# Patient Record
Sex: FEMALE | Race: ASIAN | NOT HISPANIC OR LATINO | URBAN - METROPOLITAN AREA
[De-identification: names, ages, dates, MRNs, and addresses within clinical notes are randomized per-mention and may not be internally consistent; named-entity substitution may affect disease eponyms.]

---

## 2019-07-21 ENCOUNTER — INPATIENT (INPATIENT)
Facility: HOSPITAL | Age: 34
LOS: 2 days | Discharge: HOME | End: 2019-07-24
Attending: OBSTETRICS & GYNECOLOGY | Admitting: OBSTETRICS & GYNECOLOGY
Payer: MEDICAID

## 2019-07-21 VITALS
TEMPERATURE: 98 F | OXYGEN SATURATION: 100 % | RESPIRATION RATE: 16 BRPM | HEART RATE: 103 BPM | DIASTOLIC BLOOD PRESSURE: 93 MMHG | SYSTOLIC BLOOD PRESSURE: 180 MMHG

## 2019-07-21 LAB
ALBUMIN SERPL ELPH-MCNC: 4.4 G/DL — SIGNIFICANT CHANGE UP (ref 3.3–5)
ALP SERPL-CCNC: 66 U/L — SIGNIFICANT CHANGE UP (ref 40–120)
ALT FLD-CCNC: 38 U/L — HIGH (ref 4–33)
ANION GAP SERPL CALC-SCNC: 12 MMO/L — SIGNIFICANT CHANGE UP (ref 7–14)
ANISOCYTOSIS BLD QL: SIGNIFICANT CHANGE UP
APPEARANCE UR: SIGNIFICANT CHANGE UP
AST SERPL-CCNC: 20 U/L — SIGNIFICANT CHANGE UP (ref 4–32)
BASOPHILS # BLD AUTO: 0.05 K/UL — SIGNIFICANT CHANGE UP (ref 0–0.2)
BASOPHILS NFR BLD AUTO: 0.5 % — SIGNIFICANT CHANGE UP (ref 0–2)
BILIRUB SERPL-MCNC: 0.3 MG/DL — SIGNIFICANT CHANGE UP (ref 0.2–1.2)
BILIRUB UR-MCNC: NEGATIVE — SIGNIFICANT CHANGE UP
BLOOD UR QL VISUAL: SIGNIFICANT CHANGE UP
BUN SERPL-MCNC: 12 MG/DL — SIGNIFICANT CHANGE UP (ref 7–23)
CALCIUM SERPL-MCNC: 9.4 MG/DL — SIGNIFICANT CHANGE UP (ref 8.4–10.5)
CHLORIDE SERPL-SCNC: 105 MMOL/L — SIGNIFICANT CHANGE UP (ref 98–107)
CO2 SERPL-SCNC: 21 MMOL/L — LOW (ref 22–31)
COLOR SPEC: SIGNIFICANT CHANGE UP
CREAT SERPL-MCNC: 0.74 MG/DL — SIGNIFICANT CHANGE UP (ref 0.5–1.3)
EOSINOPHIL # BLD AUTO: 0.32 K/UL — SIGNIFICANT CHANGE UP (ref 0–0.5)
EOSINOPHIL NFR BLD AUTO: 3.1 % — SIGNIFICANT CHANGE UP (ref 0–6)
GLUCOSE SERPL-MCNC: 101 MG/DL — HIGH (ref 70–99)
GLUCOSE UR-MCNC: NEGATIVE — SIGNIFICANT CHANGE UP
HCT VFR BLD CALC: 32.6 % — LOW (ref 34.5–45)
HCT VFR BLD CALC: 34.2 % — LOW (ref 34.5–45)
HGB BLD-MCNC: 8.4 G/DL — LOW (ref 11.5–15.5)
HGB BLD-MCNC: 9 G/DL — LOW (ref 11.5–15.5)
HYPOCHROMIA BLD QL: SLIGHT — SIGNIFICANT CHANGE UP
IMM GRANULOCYTES NFR BLD AUTO: 0.3 % — SIGNIFICANT CHANGE UP (ref 0–1.5)
KETONES UR-MCNC: NEGATIVE — SIGNIFICANT CHANGE UP
LEUKOCYTE ESTERASE UR-ACNC: NEGATIVE — SIGNIFICANT CHANGE UP
LIDOCAIN IGE QN: 35.4 U/L — SIGNIFICANT CHANGE UP (ref 7–60)
LYMPHOCYTES # BLD AUTO: 28.8 % — SIGNIFICANT CHANGE UP (ref 13–44)
LYMPHOCYTES # BLD AUTO: 3.02 K/UL — SIGNIFICANT CHANGE UP (ref 1–3.3)
MANUAL SMEAR VERIFICATION: SIGNIFICANT CHANGE UP
MCHC RBC-ENTMCNC: 18.2 PG — LOW (ref 27–34)
MCHC RBC-ENTMCNC: 18.8 PG — LOW (ref 27–34)
MCHC RBC-ENTMCNC: 25.8 % — LOW (ref 32–36)
MCHC RBC-ENTMCNC: 26.3 % — LOW (ref 32–36)
MCV RBC AUTO: 70.7 FL — LOW (ref 80–100)
MCV RBC AUTO: 71.4 FL — LOW (ref 80–100)
MICROCYTES BLD QL: SIGNIFICANT CHANGE UP
MONOCYTES # BLD AUTO: 0.4 K/UL — SIGNIFICANT CHANGE UP (ref 0–0.9)
MONOCYTES NFR BLD AUTO: 3.8 % — SIGNIFICANT CHANGE UP (ref 2–14)
NEUTROPHILS # BLD AUTO: 6.67 K/UL — SIGNIFICANT CHANGE UP (ref 1.8–7.4)
NEUTROPHILS NFR BLD AUTO: 63.5 % — SIGNIFICANT CHANGE UP (ref 43–77)
NITRITE UR-MCNC: NEGATIVE — SIGNIFICANT CHANGE UP
NRBC # FLD: 0 K/UL — SIGNIFICANT CHANGE UP (ref 0–0)
NRBC # FLD: 0 K/UL — SIGNIFICANT CHANGE UP (ref 0–0)
PH UR: 5.5 — SIGNIFICANT CHANGE UP (ref 5–8)
PLATELET # BLD AUTO: 459 K/UL — HIGH (ref 150–400)
PLATELET # BLD AUTO: 465 K/UL — HIGH (ref 150–400)
PLATELET COUNT - ESTIMATE: NORMAL — SIGNIFICANT CHANGE UP
PMV BLD: 11.1 FL — SIGNIFICANT CHANGE UP (ref 7–13)
PMV BLD: 11.3 FL — SIGNIFICANT CHANGE UP (ref 7–13)
POIKILOCYTOSIS BLD QL AUTO: SLIGHT — SIGNIFICANT CHANGE UP
POLYCHROMASIA BLD QL SMEAR: SLIGHT — SIGNIFICANT CHANGE UP
POTASSIUM SERPL-MCNC: 3.8 MMOL/L — SIGNIFICANT CHANGE UP (ref 3.5–5.3)
POTASSIUM SERPL-SCNC: 3.8 MMOL/L — SIGNIFICANT CHANGE UP (ref 3.5–5.3)
PROT SERPL-MCNC: 7.9 G/DL — SIGNIFICANT CHANGE UP (ref 6–8.3)
PROT UR-MCNC: 100 — HIGH
RBC # BLD: 4.61 M/UL — SIGNIFICANT CHANGE UP (ref 3.8–5.2)
RBC # BLD: 4.79 M/UL — SIGNIFICANT CHANGE UP (ref 3.8–5.2)
RBC # FLD: 19.9 % — HIGH (ref 10.3–14.5)
RBC # FLD: 19.9 % — HIGH (ref 10.3–14.5)
RBC CASTS # UR COMP ASSIST: SIGNIFICANT CHANGE UP (ref 0–?)
SODIUM SERPL-SCNC: 138 MMOL/L — SIGNIFICANT CHANGE UP (ref 135–145)
SP GR SPEC: 1.02 — SIGNIFICANT CHANGE UP (ref 1–1.04)
UROBILINOGEN FLD QL: 2 — SIGNIFICANT CHANGE UP
WBC # BLD: 10.49 K/UL — SIGNIFICANT CHANGE UP (ref 3.8–10.5)
WBC # BLD: 11.98 K/UL — HIGH (ref 3.8–10.5)
WBC # FLD AUTO: 10.49 K/UL — SIGNIFICANT CHANGE UP (ref 3.8–10.5)
WBC # FLD AUTO: 11.98 K/UL — HIGH (ref 3.8–10.5)
WBC UR QL: SIGNIFICANT CHANGE UP (ref 0–?)

## 2019-07-21 PROCEDURE — 76830 TRANSVAGINAL US NON-OB: CPT | Mod: 26

## 2019-07-21 PROCEDURE — 74182 MRI ABDOMEN W/CONTRAST: CPT | Mod: 26

## 2019-07-21 PROCEDURE — 72196 MRI PELVIS W/DYE: CPT | Mod: 26

## 2019-07-21 RX ORDER — KETOROLAC TROMETHAMINE 30 MG/ML
30 SYRINGE (ML) INJECTION ONCE
Refills: 0 | Status: DISCONTINUED | OUTPATIENT
Start: 2019-07-21 | End: 2019-07-21

## 2019-07-21 RX ADMIN — Medication 30 MILLIGRAM(S): at 15:21

## 2019-07-21 NOTE — ED ADULT NURSE NOTE - OBJECTIVE STATEMENT
34 year old [place] for [place]. Denies PMH but states she does not see doctors in [facility]. pt states that her period is due on 7/23 however began to experience pelvic pain last night and heavy vaginal bleeding with clot passage this morning. denies dizzines denies LOC denies SOB or light headed. abd soft non tender, denies NVD. IV 22 g RAC labs sent TQ removed. Hgb noted to be 8.5 but pt hemodynamically table and not symptomatic. will CTM.

## 2019-07-21 NOTE — ED CDU PROVIDER INITIAL DAY NOTE - PMH
Diabetes mellitus    HTN (hypertension) <<----- Click to add NO pertinent Past Medical History No significant past medical history

## 2019-07-21 NOTE — ED PROVIDER NOTE - OBJECTIVE STATEMENT
35 yo female PMHx of HTN and DM presents to the ED c/o constant, sharp, 8/10, lower left abdominal pain that radiates to her back that woke her from sleep this morning. She has not taken any pain medication and admits that moving makes the pain worse and laying still makes the pain better. She also endorses that she is having heavy vaginal bleeding with clots that is more intense than her usual menstrual bleeding. Her LMP was 6/23/19. She last had sexual intercourse 3 weeks ago without protection, she is not using any contraception. She does not have a PCP or OB/GYN, and has not seen a provider since she was pregnant with her child 18 years ago. She denies fever, chills, SOB, CP, n/v/d, vaginal discharge, dysuria, prior episodes, or any other complaints. 35 yo F pmh HTN and DM pw c/o constant, sharp, 8/10, lower left abdominal pain that radiates to her back that woke her from sleep this morning. She has not taken any pain medication and admits that moving makes the pain worse and laying still makes the pain better. She also endorses that she is having heavy vaginal bleeding with clots that is more intense than her usual menstrual bleeding. Her LMP was 6/23/19. She last had sexual intercourse 3 weeks ago without protection, she is not using any contraception. She does not have a PCP or OB/GYN, and has not seen a provider since she was pregnant with her child 18 years ago. She denies fever, chills, SOB, CP, n/v/d, vaginal discharge, dysuria, prior episodes, or any other complaints. 35 yo F pmh HTN and DM pw c/o constant, nicole, 8/10, lower left abdominal pain radiating to her back accompanied with vaginal bleeding upon awakening this am. LMP 6/23- normal menstrual period at that time.  States today's bleeding much heavier and passing clots having gone through 15 pads within 6 hours. Patient is sexually active- last 3 weeks ago without protection. Has not seen a doctor nor had BW done in over 10 years. Has only one child- 18 years in age- normal pregnancy. Denies fever, chills, SOB, CP, n/v/d, vaginal discharge, dysuria, prior episodes, or any other complaints. Appears well. no distress.

## 2019-07-21 NOTE — ED CDU PROVIDER INITIAL DAY NOTE - OBJECTIVE STATEMENT
33 yo female  with no significant medical history c/o acute onset diffuse lower abdominal cramping and heavy vaginal bleeding that began this morning. Pt due for her period tomorrow but today began bleeding and required her to change her pad 15 times prior to arrival. NO associated dizziness, chest pain or SOB. Pt found to be anemic in the ED but unclear if prior h/o anemia sicne pt has seen a doctor in 18 years. Pt found to have ovarian mass on TVUS and requires MRI for further assessment.

## 2019-07-21 NOTE — ED PROVIDER NOTE - CLINICAL SUMMARY MEDICAL DECISION MAKING FREE TEXT BOX
A: 33 yo female PMHx of HTN and DM presents to the ED c/o constant, sharp, 8/10, lower left abdominal pain that radiates to her back that woke her from sleep this morning. Also complains of heavy vaginal bleeding with clots. POC bHCG negative. Consider ovarian torsion vs ovarian cyst. Less likely diverticulitis vs pancreatitis.  P: labs, pain control, TVUS to rule out ovarian torsion vs ovarian cyst. 33 yo F pmh HTN, DM pw c/o constant, sharp, 8/10, lower left abdominal pain radiating to her back accompanied with heavy vaginal bleeding upon awakening this am out of the norm from usual menstrual periods, POC UCG negative. Consider GYNGU pathology. In the absence of n/v/d, f/c less likely abdominal pathology- will obtain basic labs, UA, UCX and TVUS, administer fluids and pain control, re-evaluate

## 2019-07-21 NOTE — ED CDU PROVIDER INITIAL DAY NOTE - ATTENDING CONTRIBUTION TO CARE
Mason: 35 yo female  with no significant medical problems c/o acute onset diffuse lower abdominal cramping and heavy vaginal bleeding that began this morning. Pt due for her period tomorrow but today began bleeding and required her to change her pad 15 times prior to arrival. NO associated dizziness, chest pain or SOB. Pt found to be anemic in the ED but unclear if prior h/o anemia sicne pt has seen a doctor in 18 years. Pt found to have ovarian mass on TVUS and requires MRI for further assessment. Exam: GENERAL: well appearing, NAD, HEENT: MMM, PERRLA, CARDIO: +S1/S2, no murmurs, rubs or gallops, LUNGS: CTA B/L, no wheezing, rales or rhonchi, GYN: exam done by CHELLY Santa and chaperoned by me, no adnexal TTP or CMT. small amount og blood in vaginal vault but no active bleeding from cervix , ABD: soft, mild diffuse lower abdominal TTP worst in LLQ, BSx4 quadrants, no guarding or rigidity. EXT: No LE edema NEURO: AxOx3, SKIN: no rashes or lesions, well perfused A/P- 33yo female with vaginal bleeding and lower abdominal pain. PT anemic on labs but baseline unclear. CDU plan for repeat CBC, GYn evaluation and MRI to assess new ovarian mass.

## 2019-07-21 NOTE — ED CDU PROVIDER INITIAL DAY NOTE - MEDICAL DECISION MAKING DETAILS
A/P- 35yo female with vaginal bleeding and lower abdominal pain. PT anemic on labs but baseline unclear. CDU plan for repeat CBC, GYn evaluation and MRI to assess new ovarian mass.

## 2019-07-21 NOTE — ED CDU PROVIDER INITIAL DAY NOTE - PHYSICAL EXAMINATION
Exam: GENERAL: well appearing, NAD, HEENT: MMM, PERRLA, CARDIO: +S1/S2, no murmurs, rubs or gallops, LUNGS: CTA B/L, no wheezing, rales or rhonchi, GYN: exam done by CHELLY Santa and chaperoned by me, no adnexal TTP or CMT. small amount of blood in vaginal vault but no active bleeding from cervix , ABD: soft, mild diffuse lower abdominal TTP worst in LLQ, BSx4 quadrants, no guarding or rigidity. MSK: No CVA TTP EXT: No LE edema NEURO: AxOx3, SKIN: no rashes or lesions, well perfused

## 2019-07-21 NOTE — ED PROVIDER NOTE - PROGRESS NOTE DETAILS
CHELLY Santa- Hg 8.4. no prior Hg's. Awaiting trasnvaginal results. Bleeding lessoning. Likely send to CDU for serial crts. PA Tiberio- Transvaginal US revealing left adnexal mass suspicious for neoplasm. Will send to CDU for MRI AB/Pelvis, serial crts. Discussed with Dr. Cuevas and CDU CHELLY Priest

## 2019-07-21 NOTE — ED ADULT NURSE NOTE - CHIEF COMPLAINT QUOTE
pt comes to ED for abdominal pain and vaginal bleeding since this morning. pt LMP 6/23 pmhx of HTN pt took meds today. pt BP is high otherwise VSS pt denies HA or blurry vision.  NAD

## 2019-07-22 ENCOUNTER — TRANSCRIPTION ENCOUNTER (OUTPATIENT)
Age: 34
End: 2019-07-22

## 2019-07-22 DIAGNOSIS — N83.9 NONINFLAMMATORY DISORDER OF OVARY, FALLOPIAN TUBE AND BROAD LIGAMENT, UNSPECIFIED: ICD-10-CM

## 2019-07-22 DIAGNOSIS — Z98.890 OTHER SPECIFIED POSTPROCEDURAL STATES: Chronic | ICD-10-CM

## 2019-07-22 LAB
APTT BLD: 28.5 SEC — SIGNIFICANT CHANGE UP (ref 27.5–36.3)
BLD GP AB SCN SERPL QL: NEGATIVE — SIGNIFICANT CHANGE UP
CANCER AG125 SERPL-ACNC: 312 U/ML — HIGH
CANCER AG19-9 SERPL-ACNC: 17 U/ML — SIGNIFICANT CHANGE UP
CEA SERPL-MCNC: 1 NG/ML — SIGNIFICANT CHANGE UP (ref 1–3.8)
GLUCOSE BLDC GLUCOMTR-MCNC: 120 MG/DL — HIGH (ref 70–99)
GLUCOSE BLDC GLUCOMTR-MCNC: 186 MG/DL — HIGH (ref 70–99)
HBA1C BLD-MCNC: 5.1 % — SIGNIFICANT CHANGE UP (ref 4–5.6)
HCG SERPL-ACNC: < 5 MIU/ML — SIGNIFICANT CHANGE UP
HCT VFR BLD CALC: 27.3 % — LOW (ref 34.5–45)
HCT VFR BLD CALC: 28.2 % — LOW (ref 34.5–45)
HGB BLD-MCNC: 7.2 G/DL — LOW (ref 11.5–15.5)
HGB BLD-MCNC: 7.4 G/DL — LOW (ref 11.5–15.5)
INR BLD: 1.11 — SIGNIFICANT CHANGE UP (ref 0.88–1.17)
MCHC RBC-ENTMCNC: 18.4 PG — LOW (ref 27–34)
MCHC RBC-ENTMCNC: 18.7 PG — LOW (ref 27–34)
MCHC RBC-ENTMCNC: 26.2 % — LOW (ref 32–36)
MCHC RBC-ENTMCNC: 26.4 % — LOW (ref 32–36)
MCV RBC AUTO: 70.1 FL — LOW (ref 80–100)
MCV RBC AUTO: 70.7 FL — LOW (ref 80–100)
NRBC # FLD: 0 K/UL — SIGNIFICANT CHANGE UP (ref 0–0)
NRBC # FLD: 0 K/UL — SIGNIFICANT CHANGE UP (ref 0–0)
PLATELET # BLD AUTO: 365 K/UL — SIGNIFICANT CHANGE UP (ref 150–400)
PLATELET # BLD AUTO: 377 K/UL — SIGNIFICANT CHANGE UP (ref 150–400)
PMV BLD: 10.5 FL — SIGNIFICANT CHANGE UP (ref 7–13)
PMV BLD: 11.8 FL — SIGNIFICANT CHANGE UP (ref 7–13)
PROTHROM AB SERPL-ACNC: 12.7 SEC — SIGNIFICANT CHANGE UP (ref 9.8–13.1)
RBC # BLD: 3.86 M/UL — SIGNIFICANT CHANGE UP (ref 3.8–5.2)
RBC # BLD: 4.02 M/UL — SIGNIFICANT CHANGE UP (ref 3.8–5.2)
RBC # FLD: 19.3 % — HIGH (ref 10.3–14.5)
RBC # FLD: 19.3 % — HIGH (ref 10.3–14.5)
RH IG SCN BLD-IMP: POSITIVE — SIGNIFICANT CHANGE UP
WBC # BLD: 10.14 K/UL — SIGNIFICANT CHANGE UP (ref 3.8–10.5)
WBC # BLD: 8.62 K/UL — SIGNIFICANT CHANGE UP (ref 3.8–10.5)
WBC # FLD AUTO: 10.14 K/UL — SIGNIFICANT CHANGE UP (ref 3.8–10.5)
WBC # FLD AUTO: 8.62 K/UL — SIGNIFICANT CHANGE UP (ref 3.8–10.5)

## 2019-07-22 PROCEDURE — 93010 ELECTROCARDIOGRAM REPORT: CPT

## 2019-07-22 PROCEDURE — 99223 1ST HOSP IP/OBS HIGH 75: CPT | Mod: AI

## 2019-07-22 RX ORDER — ACETAMINOPHEN 500 MG
975 TABLET ORAL EVERY 6 HOURS
Refills: 0 | Status: DISCONTINUED | OUTPATIENT
Start: 2019-07-22 | End: 2019-07-24

## 2019-07-22 RX ORDER — DEXTROSE 50 % IN WATER 50 %
25 SYRINGE (ML) INTRAVENOUS ONCE
Refills: 0 | Status: DISCONTINUED | OUTPATIENT
Start: 2019-07-22 | End: 2019-07-24

## 2019-07-22 RX ORDER — SODIUM CHLORIDE 9 MG/ML
1000 INJECTION, SOLUTION INTRAVENOUS
Refills: 0 | Status: DISCONTINUED | OUTPATIENT
Start: 2019-07-22 | End: 2019-07-24

## 2019-07-22 RX ORDER — INSULIN LISPRO 100/ML
VIAL (ML) SUBCUTANEOUS
Refills: 0 | Status: DISCONTINUED | OUTPATIENT
Start: 2019-07-22 | End: 2019-07-24

## 2019-07-22 RX ORDER — DEXTROSE 50 % IN WATER 50 %
15 SYRINGE (ML) INTRAVENOUS ONCE
Refills: 0 | Status: DISCONTINUED | OUTPATIENT
Start: 2019-07-22 | End: 2019-07-24

## 2019-07-22 RX ORDER — INSULIN LISPRO 100/ML
VIAL (ML) SUBCUTANEOUS AT BEDTIME
Refills: 0 | Status: DISCONTINUED | OUTPATIENT
Start: 2019-07-22 | End: 2019-07-23

## 2019-07-22 RX ORDER — KETOROLAC TROMETHAMINE 30 MG/ML
30 SYRINGE (ML) INJECTION ONCE
Refills: 0 | Status: DISCONTINUED | OUTPATIENT
Start: 2019-07-22 | End: 2019-07-22

## 2019-07-22 RX ORDER — GLUCAGON INJECTION, SOLUTION 0.5 MG/.1ML
1 INJECTION, SOLUTION SUBCUTANEOUS ONCE
Refills: 0 | Status: DISCONTINUED | OUTPATIENT
Start: 2019-07-22 | End: 2019-07-24

## 2019-07-22 RX ORDER — DEXTROSE 50 % IN WATER 50 %
12.5 SYRINGE (ML) INTRAVENOUS ONCE
Refills: 0 | Status: DISCONTINUED | OUTPATIENT
Start: 2019-07-22 | End: 2019-07-24

## 2019-07-22 RX ORDER — IBUPROFEN 200 MG
600 TABLET ORAL EVERY 6 HOURS
Refills: 0 | Status: DISCONTINUED | OUTPATIENT
Start: 2019-07-22 | End: 2019-07-24

## 2019-07-22 RX ADMIN — Medication 600 MILLIGRAM(S): at 16:43

## 2019-07-22 RX ADMIN — Medication 975 MILLIGRAM(S): at 20:40

## 2019-07-22 RX ADMIN — Medication 600 MILLIGRAM(S): at 16:13

## 2019-07-22 RX ADMIN — SODIUM CHLORIDE 125 MILLILITER(S): 9 INJECTION, SOLUTION INTRAVENOUS at 09:55

## 2019-07-22 RX ADMIN — Medication 30 MILLIGRAM(S): at 05:43

## 2019-07-22 RX ADMIN — Medication 975 MILLIGRAM(S): at 19:52

## 2019-07-22 NOTE — H&P ADULT - NSHPLABSRESULTS_GEN_ALL_CORE
< end of copied text >    < from: MR Pelvis w/ IV Cont (07.21.19 @ 23:10) >    EXAM:  MR PELVIS IC      EXAM:  MR ABDOMEN IC        PROCEDURE DATE:  Jul 21 2019         INTERPRETATION:  [facility] RADIOLOGIST PRELIMINARY REPORT    EXAM:    MR Abdomen Without and With Contrast     EXAM DATE/TIME:    7/21/2019 10:10 PM     CLINICAL HISTORY:    34 years old, female; heavy vaginal bleeding and left ovarian mass    TECHNIQUE:    Imaging protocol: MR of the abdomen without and with intravenous   contrast.     COMPARISON:   Ultrasound 07/21/2019    FINDINGS:    Liver: No mass.    Gallbladder and bile ducts: Unremarkable. No stones. No ductal dilation.    Pancreas: Unremarkable. No ductal dilation.    Spleen: Unremarkable. No splenomegaly.    Adrenals: Unremarkable. No mass.    Kidneys and ureters: Small bilateral renal cysts   Stomach and bowel: Unremarkable.    Intraperitoneal space: No fluid collection.    Arteries: No abdominal aortic aneurysm.    Lymph nodes: Few 1 cm right lower quadrant mesenteric lymph nodes.    Bones/joints: Unremarkable.     Soft tissues: Unremarkable.     IMPRESSION:   Bilateral renal cysts as described above.       =========================  EXAM:    MR Pelvis Without and With Contrast     EXAM DATE/TIME:    7/21/2019 10:10 PM     CLINICAL HISTORY:    34 years old, female; left adnexal mass and heavy vaginal bleeding    TECHNIQUE:    Imaging protocol: Magnetic resonance images of the pelvis without and   with   intravenous contrast.     COMPARISON:    No relevant prior studies available.     FINDINGS:    Limitations: Motion artifact limits this study.    Intraperitoneal space: No free fluid.    Reproductive: Uterus enlarged 6 x 6 x 11 cm; endometrium approximately   5-6   mm. Multicystic changes of the uterine walls with irregular thickening   particularly along the anterior wall. with very poorly defined junction   zone consistent with adenomyosis  .    12 x 9 x 9.3 cm cystic mass, likely involving left ovary within the   pelvic culdesac; faint enhancement of the septa. Hemorrhagic component   raising the possibility of endometriosis. No definite nodule   4.5 x 5 cm cyst in right ovary, likely physiologic   Bones/joints: Unremarkable. No fracture.    Soft tissues: Unremarkable.     IMPRESSION:   Adenomyosis of the uterus.  Multiseptated left adnexal and cul-de-sac mass with hemorrhagic component   most suggestive of ovarian neoplasm, likely benign, with differential   diagnosis of endometriosis.    < end of copied text >

## 2019-07-22 NOTE — H&P ADULT - ATTENDING COMMENTS
saw patient with resident. Large complex adnexal mass seen on sonogram in a setting of pelvic pain, recommendation is to remove ovarian mass. patient's pain was tolerable after receiving toradol. Er consulted gyn at 9:00 AM this morning and patient had just finished eating a sandwich. Decision was to put patient on tomorrow's schedule since pain is presently tolerable and cannot do it now since patient just ate.

## 2019-07-22 NOTE — ED CDU PROVIDER SUBSEQUENT DAY NOTE - HISTORY
33 y/o female no pmh c/o lower abd pain and heavy vaginal bleeding x today. Pt admit sto changing her pad about 15 times. denies chest pain, sob, n/v/d, dizziness, syncope, fever or chills. Pt found to have abnormal TVUS w/ ovarian mass vs hydrosalpinx and sent to CDU for MRI.

## 2019-07-22 NOTE — CONSULT NOTE ADULT - ASSESSMENT
35 yo female with T2DM on oral meds, HTN present with acute onset abdominal pain and vaginal bleeding, found to have left adnexal multiseptated complex cyst concern for malignancy vs endometriosis. Medicine was consulted for pre-operative clearance.     #Pre-operative clearance  -Patient with no known pulmonary or cardiac history.   -RCRi score of 1 corresponding to 6% 30 days risk of death, MI or cardiac arrest.   -Patient is a low risk patient undergoing a high risk procedure. Patient is currently medically optimized to proceed for surgery tomorrow. Pending EKG    #Acute blood loss anemia with microcytosis  -In the setting of vaginal bleeding from hemorrhaging ovarian cyst and adenomyosis  -Recommending checking CBC q12h for now, and transfuse to keep hgb > 7. Active T&S.  -Start ferrous sulfate daily.   -Check iron study with TIBC, ferritin.     Rolf Cano M.D. - PGY3  Internal Medicine Resident  MAR - [phone] 35 yo female with T2DM on oral meds, HTN present with acute onset abdominal pain and vaginal bleeding, found to have left adnexal multiseptated complex cyst concern for malignancy vs endometriosis. Medicine was consulted for pre-operative clearance.     #Pre-operative clearance  -Patient with no known pulmonary or cardiac history. METS>4.   -RCRi score of 1 corresponding to 6% 30 days risk of death, MI or cardiac arrest.   -EKG with nonspecific TWI in lead V6, no prior EKG to compare. No acute ST/T wave abnormalities to suggest acute ischemia  -Patient is a low risk patient undergoing a high risk procedure. Patient is currently medically optimized to proceed for surgery tomorrow. No further testing/imaging needed at this time.     #Acute blood loss anemia with microcytosis  -In the setting of vaginal bleeding from hemorrhaging ovarian cyst and adenomyosis  -Recommending checking CBC q12h for now, and transfuse to keep hgb > 7. Active T&S.  -Start ferrous sulfate daily.   -Check iron study with TIBC, ferritin.     Rolf Cano M.D. - PGY3  Internal Medicine Resident  [facility] - [phone]

## 2019-07-22 NOTE — ED CDU PROVIDER DISPOSITION NOTE - CLINICAL COURSE
33 y/o female presented to ED c/o sharp lower abdominal pain and vaginal bleeding. In ED had US showing ? cystic mass to left ovary. Sent to CDU for trending h/h, serial abd exams and MRI for further evaluation of lesion. MRI revealing 12cm ovarian mass - seen by SHAHLA who is recommending admission for OR tomorrow. Pt. stable at this time. WIll be kept NPO after midnight.

## 2019-07-22 NOTE — H&P ADULT - PROBLEM SELECTOR PLAN 1
- patient to be admitted to inpatient gyn service  - patient to be booked for OR for laparoscopic left salpingo-oophorectomy, possible ex-lap, possible staging, Pap smear  - consents to be signed  - OR booking form to be submitted  - regular diet with NPO at midnight prior to surgery  - medicine consult called for medical clearance prior to OR  - tylenol and motrin for pain  - continue to monitor VS and sxs    Patient seen w/ attending Dr. Janis Hdz MD PGY3

## 2019-07-22 NOTE — H&P ADULT - HISTORY OF PRESENT ILLNESS
34yoF  h/o cHTN, DM LMP 19 presents with newly found left adnexal mass on imaging yesterday. Pt presented to the ED yesterday after she awoke with lower abdominal pain and heavy vaginal bleeding. Pt states that she never had this sort of pain with previous periods and her periods are regular and occur every month. Pt reports that this period is much heavier than her normal period. Due to these concerns she presented to the ED where a transvaginal ultrasound was performed and a left adnexal mass was seen (as below in radiology report). Radiology recommended MRI to f/u transvaginal ultrasound and patient was sent to CDU for H&H trending and MRI overnight. MRI was performed and results were pending until this AM. Following prelim MRI read, gyn was consulted for evaluation. Upon evaluation with attending, pt reports continued pain despite toradol and states she just ate breakfast. Pt reports that she was last seen by an ob/gyn 18 years ago at the birth of her daughter. She has not had a pap smear in that time. Pt also reports that she does not follow up for her HTN and DM and is on no medications at present. Pt denies n/v, f/c, CP, SOB, or any other concerns.      OB/GYN HISTORY:   NSVDx1  TOPx4    Last Menstrual Period: 19    Name of GYN Physician: none  Date of Last Pap:   History of Abnormal Pap: denies

## 2019-07-22 NOTE — H&P ADULT - NSHPPHYSICALEXAM_GEN_ALL_CORE
Vital Signs Last 24 Hrs  T(C): 37 (22 Jul 2019 10:26), Max: 37.2 (21 Jul 2019 19:00)  T(F): 98.6 (22 Jul 2019 10:26), Max: 99 (21 Jul 2019 21:28)  HR: 99 (22 Jul 2019 10:26) (88 - 103)  BP: 149/96 (22 Jul 2019 10:26) (149/96 - 180/93)  BP(mean): --  RR: 16 (22 Jul 2019 10:26) (16 - 18)  SpO2: 100% (22 Jul 2019 10:26) (100% - 100%)    PHYSICAL EXAM:  Gen: NAD, alert and oriented x 3  Cardiovascular: regular   Respiratory: breathing comfortably on RA  Abd: soft, diffusely tender across lowe abdomen, non-distended; no rebound, no guarding  Pelvic: closed/long, with minimal blood in the vault and no active bleeding   Extremities: NTBL  Skin: warm and well perfused

## 2019-07-23 ENCOUNTER — RESULT REVIEW (OUTPATIENT)
Age: 34
End: 2019-07-23

## 2019-07-23 LAB
BACTERIA UR CULT: SIGNIFICANT CHANGE UP
BASOPHILS # BLD AUTO: 0.03 K/UL — SIGNIFICANT CHANGE UP (ref 0–0.2)
BASOPHILS NFR BLD AUTO: 0.4 % — SIGNIFICANT CHANGE UP (ref 0–2)
BLD GP AB SCN SERPL QL: NEGATIVE — SIGNIFICANT CHANGE UP
EOSINOPHIL # BLD AUTO: 0.26 K/UL — SIGNIFICANT CHANGE UP (ref 0–0.5)
EOSINOPHIL NFR BLD AUTO: 3.2 % — SIGNIFICANT CHANGE UP (ref 0–6)
FERRITIN SERPL-MCNC: 16.11 NG/ML — SIGNIFICANT CHANGE UP (ref 15–150)
GLUCOSE BLDC GLUCOMTR-MCNC: 101 MG/DL — HIGH (ref 70–99)
GLUCOSE BLDC GLUCOMTR-MCNC: 103 MG/DL — HIGH (ref 70–99)
GLUCOSE BLDC GLUCOMTR-MCNC: 195 MG/DL — HIGH (ref 70–99)
GLUCOSE BLDC GLUCOMTR-MCNC: 219 MG/DL — HIGH (ref 70–99)
HCT VFR BLD CALC: 27.6 % — LOW (ref 34.5–45)
HGB BLD-MCNC: 7.3 G/DL — LOW (ref 11.5–15.5)
IMM GRANULOCYTES NFR BLD AUTO: 0.5 % — SIGNIFICANT CHANGE UP (ref 0–1.5)
IRON SATN MFR SERPL: 12 UG/DL — LOW (ref 30–160)
IRON SATN MFR SERPL: 510 UG/DL — SIGNIFICANT CHANGE UP (ref 140–530)
LYMPHOCYTES # BLD AUTO: 2.99 K/UL — SIGNIFICANT CHANGE UP (ref 1–3.3)
LYMPHOCYTES # BLD AUTO: 37 % — SIGNIFICANT CHANGE UP (ref 13–44)
MCHC RBC-ENTMCNC: 18.9 PG — LOW (ref 27–34)
MCHC RBC-ENTMCNC: 26.4 % — LOW (ref 32–36)
MCV RBC AUTO: 71.3 FL — LOW (ref 80–100)
MONOCYTES # BLD AUTO: 0.34 K/UL — SIGNIFICANT CHANGE UP (ref 0–0.9)
MONOCYTES NFR BLD AUTO: 4.2 % — SIGNIFICANT CHANGE UP (ref 2–14)
NEUTROPHILS # BLD AUTO: 4.43 K/UL — SIGNIFICANT CHANGE UP (ref 1.8–7.4)
NEUTROPHILS NFR BLD AUTO: 54.7 % — SIGNIFICANT CHANGE UP (ref 43–77)
NRBC # FLD: 0 K/UL — SIGNIFICANT CHANGE UP (ref 0–0)
PLATELET # BLD AUTO: 337 K/UL — SIGNIFICANT CHANGE UP (ref 150–400)
PMV BLD: 11.2 FL — SIGNIFICANT CHANGE UP (ref 7–13)
RBC # BLD: 3.87 M/UL — SIGNIFICANT CHANGE UP (ref 3.8–5.2)
RBC # FLD: 19.3 % — HIGH (ref 10.3–14.5)
RH IG SCN BLD-IMP: POSITIVE — SIGNIFICANT CHANGE UP
SPECIMEN SOURCE: SIGNIFICANT CHANGE UP
TRANSFERRIN SERPL-MCNC: 426 MG/DL — HIGH (ref 200–360)
UIBC SERPL-MCNC: 498.4 UG/DL — HIGH (ref 110–370)
WBC # BLD: 8.09 K/UL — SIGNIFICANT CHANGE UP (ref 3.8–10.5)
WBC # FLD AUTO: 8.09 K/UL — SIGNIFICANT CHANGE UP (ref 3.8–10.5)

## 2019-07-23 PROCEDURE — 88305 TISSUE EXAM BY PATHOLOGIST: CPT | Mod: 26

## 2019-07-23 PROCEDURE — 99232 SBSQ HOSP IP/OBS MODERATE 35: CPT | Mod: 57

## 2019-07-23 PROCEDURE — 88302 TISSUE EXAM BY PATHOLOGIST: CPT | Mod: 26

## 2019-07-23 PROCEDURE — 88307 TISSUE EXAM BY PATHOLOGIST: CPT | Mod: 26

## 2019-07-23 RX ORDER — ASCORBIC ACID 60 MG
500 TABLET,CHEWABLE ORAL DAILY
Refills: 0 | Status: DISCONTINUED | OUTPATIENT
Start: 2019-07-23 | End: 2019-07-24

## 2019-07-23 RX ORDER — LISINOPRIL 2.5 MG/1
5 TABLET ORAL DAILY
Refills: 0 | Status: DISCONTINUED | OUTPATIENT
Start: 2019-07-23 | End: 2019-07-23

## 2019-07-23 RX ORDER — INSULIN LISPRO 100/ML
2 VIAL (ML) SUBCUTANEOUS ONCE
Refills: 0 | Status: DISCONTINUED | OUTPATIENT
Start: 2019-07-23 | End: 2019-07-23

## 2019-07-23 RX ORDER — METOCLOPRAMIDE HCL 10 MG
10 TABLET ORAL EVERY 6 HOURS
Refills: 0 | Status: DISCONTINUED | OUTPATIENT
Start: 2019-07-23 | End: 2019-07-24

## 2019-07-23 RX ORDER — INSULIN LISPRO 100/ML
2 VIAL (ML) SUBCUTANEOUS ONCE
Refills: 0 | Status: COMPLETED | OUTPATIENT
Start: 2019-07-23 | End: 2019-07-23

## 2019-07-23 RX ORDER — HYDROMORPHONE HYDROCHLORIDE 2 MG/ML
0.5 INJECTION INTRAMUSCULAR; INTRAVENOUS; SUBCUTANEOUS
Refills: 0 | Status: DISCONTINUED | OUTPATIENT
Start: 2019-07-23 | End: 2019-07-23

## 2019-07-23 RX ORDER — OXYCODONE HYDROCHLORIDE 5 MG/1
5 TABLET ORAL EVERY 4 HOURS
Refills: 0 | Status: DISCONTINUED | OUTPATIENT
Start: 2019-07-23 | End: 2019-07-24

## 2019-07-23 RX ORDER — HYDROMORPHONE HYDROCHLORIDE 2 MG/ML
1 INJECTION INTRAMUSCULAR; INTRAVENOUS; SUBCUTANEOUS
Refills: 0 | Status: DISCONTINUED | OUTPATIENT
Start: 2019-07-23 | End: 2019-07-23

## 2019-07-23 RX ORDER — CALCIUM CARBONATE 500(1250)
3 TABLET ORAL EVERY 6 HOURS
Refills: 0 | Status: DISCONTINUED | OUTPATIENT
Start: 2019-07-23 | End: 2019-07-24

## 2019-07-23 RX ORDER — LISINOPRIL 2.5 MG/1
5 TABLET ORAL DAILY
Refills: 0 | Status: DISCONTINUED | OUTPATIENT
Start: 2019-07-23 | End: 2019-07-24

## 2019-07-23 RX ORDER — ONDANSETRON 8 MG/1
4 TABLET, FILM COATED ORAL ONCE
Refills: 0 | Status: DISCONTINUED | OUTPATIENT
Start: 2019-07-23 | End: 2019-07-23

## 2019-07-23 RX ORDER — ONDANSETRON 8 MG/1
4 TABLET, FILM COATED ORAL ONCE
Refills: 0 | Status: COMPLETED | OUTPATIENT
Start: 2019-07-23 | End: 2019-07-23

## 2019-07-23 RX ORDER — FERROUS SULFATE 325(65) MG
325 TABLET ORAL DAILY
Refills: 0 | Status: DISCONTINUED | OUTPATIENT
Start: 2019-07-23 | End: 2019-07-24

## 2019-07-23 RX ORDER — FENTANYL CITRATE 50 UG/ML
50 INJECTION INTRAVENOUS
Refills: 0 | Status: DISCONTINUED | OUTPATIENT
Start: 2019-07-23 | End: 2019-07-23

## 2019-07-23 RX ADMIN — Medication 975 MILLIGRAM(S): at 04:54

## 2019-07-23 RX ADMIN — Medication 2 UNIT(S): at 23:52

## 2019-07-23 RX ADMIN — ONDANSETRON 4 MILLIGRAM(S): 8 TABLET, FILM COATED ORAL at 23:08

## 2019-07-23 RX ADMIN — HYDROMORPHONE HYDROCHLORIDE 0.5 MILLIGRAM(S): 2 INJECTION INTRAMUSCULAR; INTRAVENOUS; SUBCUTANEOUS at 19:15

## 2019-07-23 RX ADMIN — HYDROMORPHONE HYDROCHLORIDE 0.5 MILLIGRAM(S): 2 INJECTION INTRAMUSCULAR; INTRAVENOUS; SUBCUTANEOUS at 18:46

## 2019-07-23 RX ADMIN — HYDROMORPHONE HYDROCHLORIDE 0.5 MILLIGRAM(S): 2 INJECTION INTRAMUSCULAR; INTRAVENOUS; SUBCUTANEOUS at 19:00

## 2019-07-23 RX ADMIN — Medication 975 MILLIGRAM(S): at 05:30

## 2019-07-23 RX ADMIN — HYDROMORPHONE HYDROCHLORIDE 0.5 MILLIGRAM(S): 2 INJECTION INTRAMUSCULAR; INTRAVENOUS; SUBCUTANEOUS at 20:00

## 2019-07-23 NOTE — CHART NOTE - NSCHARTNOTEFT_GEN_A_CORE
R2 GYN POST-OP CHECK    Patient seen and evaluated at bedside.    Pt sleeping, slowly arousable  Patient reports pain controlled with analgesia, complains of nausea and abdominal discomfort. Patient burping, not passing flatus.   Patient reports abdominal discomfort limits deep breathing. No cough, no chest pain, shortness of breath, palpitations.  No vomiting.  Tolerating clears.    Not OOB yet.    O:   T(C): 36.6 (07-23-19 @ 21:30), Max: 36.6 (07-23-19 @ 21:30)  HR: 105 (07-23-19 @ 21:30) (104 - 108)  BP: 132/76 (07-23-19 @ 21:30) (112/68 - 138/76)  RR: 20 (07-23-19 @ 21:30) (19 - 40)  SpO2: 98% (07-23-19 @ 21:30) (93% - 100%)  Wt(kg): --  I&O's Summary    22 Jul 2019 07:01  -  23 Jul 2019 07:00  --------------------------------------------------------  IN: 1375 mL / OUT: 0 mL / NET: 1375 mL    23 Jul 2019 07:01  -  23 Jul 2019 23:09  --------------------------------------------------------  IN: 125 mL / OUT: 300 mL / NET: -175 mL      VS at bedside:  (120 palpated)         /79       O2Sat: 93%  Gen: Resting comfortably in bed, NAD  CV: S1S2, RRR  Lungs: CTA B/L  Abd: soft, appropriately tender, occasional BS x 4 quadrants. Distended, tympanic  Inc: laparoscopic port sites clean/dry/intact w/ bandages in place x 3  Pelvic: No bleeding on pad  Ext: SCD's in place and functional, non-tender b/l, no edema    acetaminophen   Tablet .. 975 milliGRAM(s) Oral every 6 hours  aluminum hydroxide/magnesium hydroxide/simethicone Suspension 30 milliLiter(s) Oral every 4 hours PRN  ascorbic acid 500 milliGRAM(s) Oral daily  calcium carbonate    500 mG (Tums) Chewable 3 Tablet(s) Chew every 6 hours PRN  dextrose 40% Gel 15 Gram(s) Oral once PRN  dextrose 5%. 1000 milliLiter(s) IV Continuous <Continuous>  dextrose 50% Injectable 12.5 Gram(s) IV Push once  dextrose 50% Injectable 25 Gram(s) IV Push once  dextrose 50% Injectable 25 Gram(s) IV Push once  ferrous    sulfate 325 milliGRAM(s) Oral daily  glucagon  Injectable 1 milliGRAM(s) IntraMuscular once PRN  ibuprofen  Tablet. 600 milliGRAM(s) Oral every 6 hours PRN  insulin lispro (HumaLOG) corrective regimen sliding scale   SubCutaneous three times a day before meals  insulin lispro Injectable (HumaLOG) 2 Unit(s) SubCutaneous once  lactated ringers. 1000 milliLiter(s) IV Continuous <Continuous>  lisinopril 5 milliGRAM(s) Oral daily  metoclopramide Injectable 10 milliGRAM(s) IV Push every 6 hours PRN  oxyCODONE    IR 5 milliGRAM(s) Oral every 4 hours PRN      A/P: 34y Female s/p Exam under anesthesia, pap, D+C, lsc Left Salpingo-Oophorectomy , R cyst drainage, and Lysis of Adhesions with post-op tachycardia, nausea, and low O2 saturation. Patient has been minimally ambulating, is moderately distended and nauseous. Low O2 sat possibly 2/2 self-limited inspiration due to gas pain. However, given post op state, tachycardia, and chronic anemia, will send stat CBC to rule out higher than estimated EBL. Will supplement O2 and monitor response.     Neuro: PO Analgesia PRN  CV: Hemodynamically stable. Monitor VS. Stat CBC sent  Pulm: Saturating 93% on room air. Will supplement O2 with NC and reassess. Encourage OOB and incentive spirometer use.   GI: CLD. Anti-emetics PRN.  : voiding spontaneously, adequately  FEN: Electrolytes: LR@125cc/hr. BMP in AM.   Heme: DVT ppx w/ SCD's while in bed. Early ambulation, initially with assistance then as tolerated. Consider HSQ   ID: Afebrile  Endo: ISS. 2U ordered x1 for coverage of .   Dispo: Continue inpatient care    d/w Dr. Waldron and Dr. Mer Lawson MD PGY2  Pager #[phone]

## 2019-07-23 NOTE — BRIEF OPERATIVE NOTE - NSICDXBRIEFPROCEDURE_GEN_ALL_CORE_FT
PROCEDURES:  Pap smear 23-Jul-2019 17:46:58  Lori Rapp  Laparoscopic lysis of adhesions with oophorectomy 23-Jul-2019 17:46:46  Lori Rapp  Dilation and curettage, uterus 23-Jul-2019 17:46:32  Lori Rapp  Laparoscopic salpingo-oophorectomy, left 23-Jul-2019 17:46:25  Lori Rapp

## 2019-07-23 NOTE — CONSULT NOTE ADULT - ASSESSMENT
33yo  presenting with heavy menstrual bleeding, new diagnosis of complex left adnexal mass, CA-125 [id number]. Plan per gyn team for LSC USO today.  Consulted for surgical back-up in the event malignancy is encountered.     -Discussed with patient the possibility of benign versus malignant etiologies and need to excise mass for ultimate diagnosis.  Discussed plan per primary gyn team for USO, but gyn onc team available as back up in the event frozen section reveals malignancy within adnexa.  If malignancy found then would proceed with hysterectomy, possible BSO, pelvic and para-aortic lymph node sampling, omentectomy, peritoneal biopsies, possible laparotomy.  Discussed limitations of frozen section sampling, and possible change in diagnosis on final pathology requiring second surgery.  Discussed possible need for adjuvant treatment pending final pathology if malignancy found. Explained surgical menopause with BSO, and inability to carry future pregnancies with hysterectomy.  Pt states understanding and says she has no desire for future childbearing.  -Attending Dr. Fuller aware; gyn oncology team will offer back up.    MD Una

## 2019-07-23 NOTE — PROGRESS NOTE ADULT - PROBLEM SELECTOR PLAN 1
Neuro: PO pain meds.   CV: Hemodynamically stable. Continue q12h CBC. VS Q4h. confirmatory T&S  Pulm: Saturating well on room air, encourage oob/amb  GI: NPO for OR  : Voiding spontanously. Tumor markers CEA:1, :312, ;17  Heme: c/w SCDs for DVT ppx  FEN: LR@125.  replete electrolytes prn   ID: Afebrile  Endo: Patient is T2DM with blood glucose ranging from 100s-180s. Never been on medications. A1c: 5.1  Dispo: Continue to optimize patient for OR this afternoon. will try to move OR time up.    Kanu Santoyo PGY-2 Neuro: PO pain meds.   CV: Hemodynamically stable. Continue q12h CBC. VS Q4h. confirmatory T&S  Pulm: Saturating well on room air, encourage oob/amb  GI: NPO for OR  : Voiding spontanously. Pad counts  Heme: c/w SCDs/AMB for DVT ppx  Endo: Patient is T2DM with blood glucose ranging from 100s-180s. Never been on medications. A1c: 5.1  Dispo: Continue to optimize patient for OR this afternoon. will try to move OR time up.    Kanu Santoyo PGY-2  Lori Stevenson PGY4

## 2019-07-23 NOTE — PROGRESS NOTE ADULT - SUBJECTIVE AND OBJECTIVE BOX
R2 GYN Progress Note HD#2    Patient seen and examined at bedside.  No acute events overnight. No acute complaints.  Patient is ambulating and made NPO for OR today.   Patient voiding spontaneously  Denies CP, SOB, N/V, fevers, and chills. Patient said that she had some LLQ pain yesterday but overall well controlled.    Vital Signs Last 24 Hours  T(C): 36.9 (07-23-19 @ 05:35), Max: 37.4 (07-22-19 @ 13:00)  HR: 91 (07-23-19 @ 05:35) (85 - 99)  BP: 154/91 (07-23-19 @ 05:35) (144/85 - 154/91)  RR: 18 (07-23-19 @ 05:35) (16 - 18)  SpO2: 100% (07-23-19 @ 05:35) (100% - 100%)    I&O's Summary    22 Jul 2019 07:01  -  23 Jul 2019 07:00  --------------------------------------------------------  IN: 1375 mL / OUT: 0 mL / NET: 1375 mL        Physical Exam:  General: NAD  Abdomen: Soft, LLQ tenderness to palpation, non-distended, no guarding/rebound  Ext: No pain or swelling     Labs:                        7.3    8.09  )-----------( 337      ( 23 Jul 2019 05:20 )             27.6   baso 0.4    eos 3.2    imm gran 0.5    lymph 37.0   mono 4.2    poly 54.7                         7.2    8.62  )-----------( 365      ( 22 Jul 2019 17:11 )             27.3   baso x      eos x      imm gran x      lymph x      mono x      poly x                            7.4    10.14 )-----------( 377      ( 22 Jul 2019 06:20 )             28.2   baso x      eos x      imm gran x      lymph x      mono x      poly x                            9.0    11.98 )-----------( 459      ( 21 Jul 2019 17:59 )             34.2   baso x      eos x      imm gran x      lymph x      mono x      poly x                            8.4    10.49 )-----------( 465      ( 21 Jul 2019 13:54 )             32.6   baso 0.5    eos 3.1    imm gran 0.3    lymph 28.8   mono 3.8    poly 63.5       MEDICATIONS  (STANDING):  acetaminophen   Tablet .. 975 milliGRAM(s) Oral every 6 hours  dextrose 5%. 1000 milliLiter(s) (50 mL/Hr) IV Continuous <Continuous>  dextrose 50% Injectable 12.5 Gram(s) IV Push once  dextrose 50% Injectable 25 Gram(s) IV Push once  dextrose 50% Injectable 25 Gram(s) IV Push once  insulin lispro (HumaLOG) corrective regimen sliding scale   SubCutaneous three times a day before meals  insulin lispro (HumaLOG) corrective regimen sliding scale   SubCutaneous at bedtime  lactated ringers. 1000 milliLiter(s) (125 mL/Hr) IV Continuous <Continuous>    MEDICATIONS  (PRN):  dextrose 40% Gel 15 Gram(s) Oral once PRN Blood Glucose LESS THAN 70 milliGRAM(s)/deciliter  glucagon  Injectable 1 milliGRAM(s) IntraMuscular once PRN Glucose LESS THAN 70 milligrams/deciliter  ibuprofen  Tablet. 600 milliGRAM(s) Oral every 6 hours PRN Moderate Pain (4 - 6) R2 GYN Progress Note HD#2    Patient seen and examined at bedside.  No acute events overnight.  Patient is ambulating and made NPO for OR today.   Denies CP, SOB, N/V, fevers, and chills. Patient said that she had 8/10 LLQ pain this AM improved with PO pain medication.     Vital Signs Last 24 Hours  T(C): 36.9 (07-23-19 @ 05:35), Max: 37.4 (07-22-19 @ 13:00)  HR: 91 (07-23-19 @ 05:35) (85 - 99)  BP: 154/91 (07-23-19 @ 05:35) (144/85 - 154/91)  RR: 18 (07-23-19 @ 05:35) (16 - 18)  SpO2: 100% (07-23-19 @ 05:35) (100% - 100%)    I&O's Summary    22 Jul 2019 07:01  -  23 Jul 2019 07:00  --------------------------------------------------------  IN: 1375 mL / OUT: 0 mL / NET: 1375 mL        Physical Exam:  General: NAD  Abdomen: Soft, LLQ tenderness to palpation, non-distended, no guarding/rebound  Ext: No pain or swelling     Labs:                        7.3    8.09  )-----------( 337      ( 23 Jul 2019 05:20 )             27.6   baso 0.4    eos 3.2    imm gran 0.5    lymph 37.0   mono 4.2    poly 54.7                         7.2    8.62  )-----------( 365      ( 22 Jul 2019 17:11 )             27.3                7.4    10.14 )-----------( 377      ( 22 Jul 2019 06:20 )             28.2                            9.0    11.98 )-----------( 459      ( 21 Jul 2019 17:59 )             34.2                           8.4    10.49 )-----------( 465      ( 21 Jul 2019 13:54 )             32.6   baso 0.5    eos 3.1    imm gran 0.3    lymph 28.8   mono 3.8    poly 63.5     Tumor markers CEA:1, :312, ;17    MEDICATIONS  (STANDING):  acetaminophen   Tablet .. 975 milliGRAM(s) Oral every 6 hours  dextrose 5%. 1000 milliLiter(s) (50 mL/Hr) IV Continuous <Continuous>  dextrose 50% Injectable 12.5 Gram(s) IV Push once  dextrose 50% Injectable 25 Gram(s) IV Push once  dextrose 50% Injectable 25 Gram(s) IV Push once  insulin lispro (HumaLOG) corrective regimen sliding scale   SubCutaneous three times a day before meals  insulin lispro (HumaLOG) corrective regimen sliding scale   SubCutaneous at bedtime  lactated ringers. 1000 milliLiter(s) (125 mL/Hr) IV Continuous <Continuous>    MEDICATIONS  (PRN):  dextrose 40% Gel 15 Gram(s) Oral once PRN Blood Glucose LESS THAN 70 milliGRAM(s)/deciliter  glucagon  Injectable 1 milliGRAM(s) IntraMuscular once PRN Glucose LESS THAN 70 milligrams/deciliter  ibuprofen  Tablet. 600 milliGRAM(s) Oral every 6 hours PRN Moderate Pain (4 - 6)

## 2019-07-23 NOTE — PROGRESS NOTE ADULT - ASSESSMENT
A/P: 34y HD2 admitted for complex left adnexal mass and LLQ pain w/ vaginal bleeding. Patient's pain controlled on PO pain medications. Made NPO for OR this afternoon for ovarian cystectomy. Patient was cleared by medicine for surgery. A/P: 34y HD2 admitted for complex left adnexal mass and LLQ pain, for OR today for diagnostic laparoscopy, LSO, possible ex-lap, possible cystoscopy. Patient's pain controlled on PO pain medications. Medicine clearance for HTN and DM2 obtained. Patient also presented with heavy menses, serial hct have been stable at 27-28.  likely elevated 2/2 endometriosis, will notify GYN ONC for possible backup however low suspicion for malignancy.

## 2019-07-23 NOTE — CONSULT NOTE ADULT - SUBJECTIVE AND OBJECTIVE BOX
Medicine Consult Note    Chief Complaint: abdominal pain and vaginal bleeding    HPI:  35 yo female with T2DM on oral meds, HTN present with acute onset abdominal pain. She states the pain started yesterday morning. Describing the pain as sharp, located on her lower abdomen and radiating to her left flank. Shortly after she had the pain, she noticed vaginal bleeding with bright red blood with blood clots. Her LMP was 6/23, usually last for 3-4 days without menorrhagia. She has been using about 15 pads yesterday due to heavy bleeding. Denied fever, chill, CP, SOB, nausea, vomiting, diarrhea, urinary symptoms, melena or hematochezia. She has no prior surgery. No hx of lung problems. No prior heart problems. Denied family history of malignancy. She is visiting from [facility] since last Thursday. She follows up regularly with her PMD in [facility] and currently taking metformin and captopril. At baseline, she is functional and independent with ADLs.     She had TVUS and MRI which showed adenomyosis and 12X9cm multiseptated left adnexal and cul-de-sac mass with hemorrhagic component most suggestive of ovarian neoplasm vs endometriosis. Medicine was consult for pre-operation clearance for surgery tomorrow.     PMHx:   DM (diabetes mellitus)  Chronic hypertension      PSHx:   No significant past surgical history      Allergies:  No Known Allergies      Medications:   acetaminophen   Tablet .. 975 milliGRAM(s) Oral every 6 hours  ibuprofen  Tablet. 600 milliGRAM(s) Oral every 6 hours PRN  lactated ringers. 1000 milliLiter(s) IV Continuous <Continuous>      FAMILY HISTORY:  No pertinent family history in first degree relatives. No hx of ovarian or breast cancer in family/relative.s       Social History:  Smoking History: denied  Alcohol Use: use socially  Drug Use: denied    Review of Systems:  CONSTITUTIONAL: No weakness, fevers or chills  EYES/ENT: No visual changes;  No dysphagia  NECK: No pain or stiffness  RESPIRATORY: No cough, wheezing, hemoptysis; No shortness of breath  CARDIOVASCULAR: No chest pain or palpitations; No lower extremity edema  GASTROINTESTINAL: + abdominal pain. No nausea, vomiting, or hematemesis; No diarrhea or constipation. No melena or hematochezia.  BACK: No back pain  GENITOURINARY: No dysuria, frequency or hematuria. + vaginal bleeding  NEUROLOGICAL: No numbness or weakness  SKIN: No itching, burning, rashes, or lesions   All other review of systems is negative unless indicated above.    Physical Exam:  T(F): 98.6 (07-22), Max: 99 (07-21)  HR: 99 (07-22) (88 - 103)  BP: 149/96 (07-22) (149/96 - 180/93)  RR: 16 (07-22)  SpO2: 100% (07-22)  GENERAL: No acute distress, well-developed  HEAD:  Atraumatic, Normocephalic  ENT: EOMI, PERRLA, conjunctiva and sclera clear, Neck supple, No JVD, moist mucosa  CHEST/LUNG: Clear to auscultation bilaterally; No wheeze, equal breath sounds bilaterally   BACK: No spinal tenderness  HEART: Regular rate and rhythm; No murmurs, rubs, or gallops  ABDOMEN: Soft, Nontender, Nondistended; Bowel sounds present  EXTREMITIES:  No clubbing, cyanosis, or edema  PSYCH: Nl behavior, nl affect  NEUROLOGY: AAOx3, non-focal, cranial nerves intact  SKIN: Normal color, No rashes or lesions    LINES:    Labs: Personally reviewed                        7.4    10.14 )-----------( 377      ( 22 Jul 2019 06:20 )             28.2     07-21    138  |  105  |  12  ----------------------------<  101<H>  3.8   |  21<L>  |  0.74    Ca    9.4      21 Jul 2019 13:54    TPro  7.9  /  Alb  4.4  /  TBili  0.3  /  DBili  x   /  AST  20  /  ALT  38<H>  /  AlkPhos  66  07-21    PT/INR - ( 22 Jul 2019 09:40 )   PT: 12.7 SEC;   INR: 1.11          PTT - ( 22 Jul 2019 09:40 )  PTT:28.5 SEC      Hemoglobin A1C, Whole Blood: 5.1 % (07-22 @ 06:00)      ECG:  pending    Imaging:  < from: MR Pelvis w/ IV Cont (07.21.19 @ 23:10) >    FINDINGS:    Limitations: Motion artifact limits this study.    Intraperitoneal space: No free fluid.    Reproductive: Uterus enlarged 6 x 6 x 11 cm; endometrium approximately   5-6   mm. Multicystic changes of the uterine walls with irregular thickening   particularly along the anterior wall. with very poorly defined junction   zone consistent with adenomyosis  .    12 x 9 x 9.3 cm cystic mass, likely involving left ovary within the   pelvic culdesac; faint enhancement of the septa. Hemorrhagic component   raising the possibility of endometriosis. No definite nodule   4.5 x 5 cm cyst in right ovary, likely physiologic   Bones/joints: Unremarkable. No fracture.    Soft tissues: Unremarkable.     IMPRESSION:   Adenomyosis of the uterus.  Multiseptated left adnexal and cul-de-sac mass with hemorrhagic component   most suggestive of ovarian neoplasm, likely benign, with differential   diagnosis of endometriosis.    < end of copied text >  < from: MR Pelvis w/ IV Cont (07.21.19 @ 23:10) >    FINDINGS:    Liver: No mass.    Gallbladder and bile ducts: Unremarkable. No stones. No ductal dilation.    Pancreas: Unremarkable. No ductal dilation.    Spleen: Unremarkable. No splenomegaly.    Adrenals: Unremarkable. No mass.    Kidneys and ureters: Small bilateral renal cysts   Stomach and bowel: Unremarkable.    Intraperitoneal space: No fluid collection.    Arteries: No abdominal aortic aneurysm.    Lymph nodes: Few 1 cm right lower quadrant mesenteric lymph nodes.    Bones/joints: Unremarkable.     Soft tissues: Unremarkable.     IMPRESSION:   Bilateral renal cysts as described above.     < end of copied text >      < from: US Transvaginal (07.21.19 @ 16:45) >  IMPRESSION:    Large, multiseptated complex cystic mass in the left adnexa which may be   ovarian in origin. This may represent an ovarian neoplasm. Hydrosalpinx   is also a consideration.  A dominant right ovarian cyst may be physiologic.  Pelvic MRI with gadolinium is advised.    < end of copied text >
GYN ONCOLOGY CONSULT NOTE    34yoF  h/o cHTN, DM, LMP 19, with complex left adnexal mass seen on US and MRI, CA-125 312, CEA wnl, CA 19-9 wnl, hcg negative.     Patient presented with lower abdominal pain and heavy vaginal bleeding yesterday to ED.  Imaging and blood work as above.  Also noted to have anemia with Hgb 7.2 this am, likely chronic in nature, exacerbated by current heavy period.  Patient denies any history of irregular bleeding, normal cycles "every thirty days."  Does not know if she has ever had a pap smear before.  No history of STD's.  No family history of breast, ovary, colon cancer.        OB/GYN HISTORY:   -, NSVDx1, TOPx4  -Unsure if she has ever had paps  -Reports normal periods, qmonth  -Denies any history of STD's    Surgical History:    Four previous induced terminations of pregnancy  No significant past surgical history      Past Medical History:   DM (diabetes mellitus)  Chronic hypertension      No Known Allergies      acetaminophen   Tablet .. 975 milliGRAM(s) Oral every 6 hours  ascorbic acid 500 milliGRAM(s) Oral daily  dextrose 40% Gel 15 Gram(s) Oral once PRN  dextrose 5%. 1000 milliLiter(s) IV Continuous <Continuous>  dextrose 50% Injectable 12.5 Gram(s) IV Push once  dextrose 50% Injectable 25 Gram(s) IV Push once  dextrose 50% Injectable 25 Gram(s) IV Push once  ferrous    sulfate 325 milliGRAM(s) Oral daily  glucagon  Injectable 1 milliGRAM(s) IntraMuscular once PRN  ibuprofen  Tablet. 600 milliGRAM(s) Oral every 6 hours PRN  insulin lispro (HumaLOG) corrective regimen sliding scale   SubCutaneous three times a day before meals  insulin lispro (HumaLOG) corrective regimen sliding scale   SubCutaneous at bedtime  lactated ringers. 1000 milliLiter(s) IV Continuous <Continuous>  lisinopril 5 milliGRAM(s) Oral daily      FAMILY HISTORY:  No pertinent family history in first degree relatives      REVIEW OF SYSTEMS:    CONSTITUTIONAL: No fever, weight loss, or fatigue  RESPIRATORY: No cough, sob  CARDIOVASCULAR: No chest pain  GASTROINTESTINAL: +lower abdominal pain      MEDICATIONS  (STANDING):  acetaminophen   Tablet .. 975 milliGRAM(s) Oral every 6 hours  ascorbic acid 500 milliGRAM(s) Oral daily  dextrose 5%. 1000 milliLiter(s) (50 mL/Hr) IV Continuous <Continuous>  dextrose 50% Injectable 12.5 Gram(s) IV Push once  dextrose 50% Injectable 25 Gram(s) IV Push once  dextrose 50% Injectable 25 Gram(s) IV Push once  ferrous    sulfate 325 milliGRAM(s) Oral daily  insulin lispro (HumaLOG) corrective regimen sliding scale   SubCutaneous three times a day before meals  insulin lispro (HumaLOG) corrective regimen sliding scale   SubCutaneous at bedtime  lactated ringers. 1000 milliLiter(s) (125 mL/Hr) IV Continuous <Continuous>  lisinopril 5 milliGRAM(s) Oral daily    MEDICATIONS  (PRN):  dextrose 40% Gel 15 Gram(s) Oral once PRN Blood Glucose LESS THAN 70 milliGRAM(s)/deciliter  glucagon  Injectable 1 milliGRAM(s) IntraMuscular once PRN Glucose LESS THAN 70 milligrams/deciliter  ibuprofen  Tablet. 600 milliGRAM(s) Oral every 6 hours PRN Moderate Pain (4 - 6)      OBJECTIVE FINDINGS:    Vital Signs Last 24 Hrs  T(C): 36.9 (2019 09:14), Max: 36.9 (2019 21:27)  T(F): 98.4 (2019 09:14), Max: 98.5 (2019 21:27)  HR: 90 (2019 09:14) (85 - 99)  BP: 163/93 (2019 09:14) (147/80 - 163/93)  BP(mean): --  RR: 18 (2019 09:14) (17 - 18)  SpO2: 100% (2019 09:14) (100% - 100%)    PHYSICAL EXAM:    GENERAL: NAD, well-developed  NERVOUS SYSTEM:  Alert & Oriented X3  ABDOMEN: Soft, Nontender, Nondistended; Minimal TTP in b/l LQ, No rebound, No guarding  PELVIC: deferred      LABS:                        7.3    8.09  )-----------( 337      ( 2019 05:20 )             27.6     07-21    138  |  105  |  12  ----------------------------<  101<H>  3.8   |  21<L>  |  0.74    Ca    9.4      2019 13:54    TPro  7.9  /  Alb  4.4  /  TBili  0.3  /  DBili  x   /  AST  20  /  ALT  38<H>  /  AlkPhos  66  07-21    PT/INR - ( 2019 09:40 )   PT: 12.7 SEC;   INR: 1.11          PTT - ( 2019 09:40 )  PTT:28.5 SEC  Urinalysis Basic - ( 2019 14:40 )    Color: RED / Appearance: Lt TURBID / S.025 / pH: 5.5  Gluc: NEGATIVE / Ketone: NEGATIVE  / Bili: NEGATIVE / Urobili: 2.0   Blood: LARGE / Protein: 100 / Nitrite: NEGATIVE   Leuk Esterase: NEGATIVE / RBC: TNTC / WBC 2-5   Sq Epi: x / Non Sq Epi: x / Bacteria: x        RADIOLOGY & ADDITIONAL STUDIES:  < from: MR Pelvis w/ IV Cont (19 @ 23:10) >  INDINGS:    Limitations: Motion artifact limits this study.    Intraperitoneal space: No free fluid.    Reproductive: Uterus enlarged 6 x 6 x 11 cm; endometrium approximately   5-6   mm. Multicystic changes of the uterine walls with irregular thickening   particularly along the anterior wall. with very poorly defined junction   zone consistent with adenomyosis  .    12 x 9 x 9.3 cm cystic mass, likely involving left ovary within the   pelvic culdesac; faint enhancement of the septa. Hemorrhagic component   raising the possibility of endometriosis. No definite nodule   4.5 x 5 cm cyst in right ovary, likely physiologic   Bones/joints: Unremarkable. No fracture.    Soft tissues: Unremarkable.     IMPRESSION:   Adenomyosis of the uterus.  Multiseptated left adnexal and cul-de-sac mass with hemorrhagic component   most suggestive of ovarian neoplasm, likely benign, with differential   diagnosis of endometriosis.      < end of copied text >

## 2019-07-23 NOTE — BRIEF OPERATIVE NOTE - OPERATION/FINDINGS
10cm multiseptated ovarian cyst (frozen benign) of left ovary, small simple cyst of right ovary, fallopian tube on right WNL, adhesions of large bowel to side wall lysed to facilitate oophorectomy

## 2019-07-23 NOTE — PROVIDER CONTACT NOTE (OTHER) - ACTION/TREATMENT ORDERED:
MD made aware and stated she would change order.
MD made aware. No new orders received. Will continue to monitor.

## 2019-07-24 ENCOUNTER — TRANSCRIPTION ENCOUNTER (OUTPATIENT)
Age: 34
End: 2019-07-24

## 2019-07-24 VITALS
RESPIRATION RATE: 17 BRPM | HEART RATE: 110 BPM | OXYGEN SATURATION: 100 % | DIASTOLIC BLOOD PRESSURE: 64 MMHG | SYSTOLIC BLOOD PRESSURE: 138 MMHG | TEMPERATURE: 98 F

## 2019-07-24 DIAGNOSIS — Z98.890 OTHER SPECIFIED POSTPROCEDURAL STATES: ICD-10-CM

## 2019-07-24 LAB
ANION GAP SERPL CALC-SCNC: 12 MMO/L — SIGNIFICANT CHANGE UP (ref 7–14)
BASOPHILS # BLD AUTO: 0.01 K/UL — SIGNIFICANT CHANGE UP (ref 0–0.2)
BASOPHILS NFR BLD AUTO: 0.1 % — SIGNIFICANT CHANGE UP (ref 0–2)
BUN SERPL-MCNC: 6 MG/DL — LOW (ref 7–23)
CALCIUM SERPL-MCNC: 9.3 MG/DL — SIGNIFICANT CHANGE UP (ref 8.4–10.5)
CHLORIDE SERPL-SCNC: 102 MMOL/L — SIGNIFICANT CHANGE UP (ref 98–107)
CO2 SERPL-SCNC: 23 MMOL/L — SIGNIFICANT CHANGE UP (ref 22–31)
CREAT SERPL-MCNC: 0.6 MG/DL — SIGNIFICANT CHANGE UP (ref 0.5–1.3)
EOSINOPHIL # BLD AUTO: 0 K/UL — SIGNIFICANT CHANGE UP (ref 0–0.5)
EOSINOPHIL NFR BLD AUTO: 0 % — SIGNIFICANT CHANGE UP (ref 0–6)
GLUCOSE BLDC GLUCOMTR-MCNC: 133 MG/DL — HIGH (ref 70–99)
GLUCOSE BLDC GLUCOMTR-MCNC: 178 MG/DL — HIGH (ref 70–99)
GLUCOSE BLDC GLUCOMTR-MCNC: 96 MG/DL — SIGNIFICANT CHANGE UP (ref 70–99)
GLUCOSE SERPL-MCNC: 148 MG/DL — HIGH (ref 70–99)
HCT VFR BLD CALC: 26.9 % — LOW (ref 34.5–45)
HCT VFR BLD CALC: 27.9 % — LOW (ref 34.5–45)
HGB BLD-MCNC: 7.1 G/DL — LOW (ref 11.5–15.5)
HGB BLD-MCNC: 7.3 G/DL — LOW (ref 11.5–15.5)
IMM GRANULOCYTES NFR BLD AUTO: 0.4 % — SIGNIFICANT CHANGE UP (ref 0–1.5)
LYMPHOCYTES # BLD AUTO: 1.76 K/UL — SIGNIFICANT CHANGE UP (ref 1–3.3)
LYMPHOCYTES # BLD AUTO: 17.3 % — SIGNIFICANT CHANGE UP (ref 13–44)
MCHC RBC-ENTMCNC: 18.2 PG — LOW (ref 27–34)
MCHC RBC-ENTMCNC: 18.5 PG — LOW (ref 27–34)
MCHC RBC-ENTMCNC: 26.2 % — LOW (ref 32–36)
MCHC RBC-ENTMCNC: 26.4 % — LOW (ref 32–36)
MCV RBC AUTO: 69 FL — LOW (ref 80–100)
MCV RBC AUTO: 70.6 FL — LOW (ref 80–100)
MONOCYTES # BLD AUTO: 0.46 K/UL — SIGNIFICANT CHANGE UP (ref 0–0.9)
MONOCYTES NFR BLD AUTO: 4.5 % — SIGNIFICANT CHANGE UP (ref 2–14)
NEUTROPHILS # BLD AUTO: 7.93 K/UL — HIGH (ref 1.8–7.4)
NEUTROPHILS NFR BLD AUTO: 77.7 % — HIGH (ref 43–77)
NRBC # FLD: 0 K/UL — SIGNIFICANT CHANGE UP (ref 0–0)
NRBC # FLD: 0.05 K/UL — SIGNIFICANT CHANGE UP (ref 0–0)
PLATELET # BLD AUTO: 332 K/UL — SIGNIFICANT CHANGE UP (ref 150–400)
PLATELET # BLD AUTO: 359 K/UL — SIGNIFICANT CHANGE UP (ref 150–400)
PMV BLD: 10.7 FL — SIGNIFICANT CHANGE UP (ref 7–13)
PMV BLD: 11.2 FL — SIGNIFICANT CHANGE UP (ref 7–13)
POTASSIUM SERPL-MCNC: 4.4 MMOL/L — SIGNIFICANT CHANGE UP (ref 3.5–5.3)
POTASSIUM SERPL-SCNC: 4.4 MMOL/L — SIGNIFICANT CHANGE UP (ref 3.5–5.3)
RBC # BLD: 3.9 M/UL — SIGNIFICANT CHANGE UP (ref 3.8–5.2)
RBC # BLD: 3.95 M/UL — SIGNIFICANT CHANGE UP (ref 3.8–5.2)
RBC # FLD: 18.8 % — HIGH (ref 10.3–14.5)
RBC # FLD: 18.9 % — HIGH (ref 10.3–14.5)
SODIUM SERPL-SCNC: 137 MMOL/L — SIGNIFICANT CHANGE UP (ref 135–145)
WBC # BLD: 10.2 K/UL — SIGNIFICANT CHANGE UP (ref 3.8–10.5)
WBC # BLD: 11.02 K/UL — HIGH (ref 3.8–10.5)
WBC # FLD AUTO: 10.2 K/UL — SIGNIFICANT CHANGE UP (ref 3.8–10.5)
WBC # FLD AUTO: 11.02 K/UL — HIGH (ref 3.8–10.5)

## 2019-07-24 PROCEDURE — 58661 LAPAROSCOPY REMOVE ADNEXA: CPT

## 2019-07-24 PROCEDURE — 58120 DILATION AND CURETTAGE: CPT

## 2019-07-24 RX ORDER — OXYCODONE HYDROCHLORIDE 5 MG/1
1 TABLET ORAL
Qty: 6 | Refills: 0
Start: 2019-07-24 | End: 2019-07-24

## 2019-07-24 RX ORDER — HEPARIN SODIUM 5000 [USP'U]/ML
5000 INJECTION INTRAVENOUS; SUBCUTANEOUS EVERY 12 HOURS
Refills: 0 | Status: DISCONTINUED | OUTPATIENT
Start: 2019-07-24 | End: 2019-07-24

## 2019-07-24 RX ORDER — IBUPROFEN 200 MG
1 TABLET ORAL
Qty: 0 | Refills: 0 | DISCHARGE
Start: 2019-07-24

## 2019-07-24 RX ORDER — ACETAMINOPHEN 500 MG
3 TABLET ORAL
Qty: 0 | Refills: 0 | DISCHARGE
Start: 2019-07-24

## 2019-07-24 RX ORDER — LISINOPRIL 2.5 MG/1
1 TABLET ORAL
Qty: 30 | Refills: 0
Start: 2019-07-24 | End: 2019-08-22

## 2019-07-24 RX ADMIN — LISINOPRIL 5 MILLIGRAM(S): 2.5 TABLET ORAL at 09:02

## 2019-07-24 RX ADMIN — Medication 600 MILLIGRAM(S): at 17:47

## 2019-07-24 RX ADMIN — Medication 600 MILLIGRAM(S): at 10:00

## 2019-07-24 RX ADMIN — Medication 975 MILLIGRAM(S): at 13:30

## 2019-07-24 RX ADMIN — HEPARIN SODIUM 5000 UNIT(S): 5000 INJECTION INTRAVENOUS; SUBCUTANEOUS at 02:40

## 2019-07-24 RX ADMIN — Medication 600 MILLIGRAM(S): at 09:04

## 2019-07-24 RX ADMIN — Medication 500 MILLIGRAM(S): at 13:27

## 2019-07-24 RX ADMIN — HEPARIN SODIUM 5000 UNIT(S): 5000 INJECTION INTRAVENOUS; SUBCUTANEOUS at 13:26

## 2019-07-24 RX ADMIN — SODIUM CHLORIDE 125 MILLILITER(S): 9 INJECTION, SOLUTION INTRAVENOUS at 02:40

## 2019-07-24 RX ADMIN — Medication 600 MILLIGRAM(S): at 18:15

## 2019-07-24 RX ADMIN — Medication 2: at 17:46

## 2019-07-24 RX ADMIN — Medication 975 MILLIGRAM(S): at 12:36

## 2019-07-24 RX ADMIN — Medication 325 MILLIGRAM(S): at 13:27

## 2019-07-24 NOTE — DISCHARGE NOTE PROVIDER - CARE PROVIDER_API CALL
JOSÉ SILVERIO,   [facility]  Phone: [phone]  Fax: (   )    -  Follow Up Time:

## 2019-07-24 NOTE — DISCHARGE NOTE PROVIDER - NSDCCPCAREPLAN_GEN_ALL_CORE_FT
PRINCIPAL DISCHARGE DIAGNOSIS  Diagnosis: Ovarian mass  Assessment and Plan of Treatment: Follow up with GYN in 2 weeks

## 2019-07-24 NOTE — DISCHARGE NOTE NURSING/CASE MANAGEMENT/SOCIAL WORK - NSDCPECAREGIVERED_GEN_ALL_CORE
pt ambulating, eating, voiding without difficulty. iv discontinued. no distress noted. i-med info given. scope sites on abdomen intact and dry without sign of infection. patient given supplies./Yes

## 2019-07-24 NOTE — CHART NOTE - NSCHARTNOTEFT_GEN_A_CORE
R2 GYN Chart Note    Patient seen at bedside for interval reassessment. Patient has no complaints.    Pain is well controlled.   Patient continues to have difficulty taking a deep breath, no pain with inspiration.     Patient is tolerating regular diet, is voiding spontaneously, and is ambulating.      T(C): 36.6 (07-24-19 @ 00:48), Max: 37 (07-23-19 @ 17:50)  HR: 105 (07-24-19 @ 00:48) (102 - 108)  BP: 131/74 (07-24-19 @ 00:48) (112/68 - 138/76)  RR: 18 (07-24-19 @ 00:48) (11 - 40)  SpO2: 99% (07-24-19 @ 00:48) (93% - 100%) (on 1L O2)      PE       RR 27  General: NAD  Chest: tachycardic, bilateral crackles in LL, R>L  Abd: soft, appropriately tender, mildly distended  Incision: clean, dry, intact  Ext: bl symmetric, nontender. SCDs in place. Elieser's neg                            7.3    11.02 )-----------( 332      ( 24 Jul 2019 01:27 )             27.9       Patient continues to be tachycardic, tachypneic postop. No complaints. Exam with b/l lung crackles, mild abdominal distention. Patient is saturating well with supplemental O2 and is well-appearing with stable CBC. Low suspicion for continued bleeding. Differential includes fluid overresuscitation v atelectasis v VTE. Patient to be additionally assessed by senior resident.    d/w Dr. Waldron, Dr. Bruce, [facility] PGY4    VWhite PGY-2

## 2019-07-24 NOTE — PROGRESS NOTE ADULT - PROBLEM SELECTOR PLAN 1
Neuro: Continue oral meds for pain control  CV: Hemodynamically stable, tachycardia low-grade and likely 2/2 pain, however if does not resolve will consider other etiologies  Pulm: Saturating well on RA. Increase incentive spirometry, out of bed, and ambulation as tolerated.  GI: Advance to reg diet as angela  : Voiding  Heme: Continue HSQ/Venodynes for DVT ppx. Increase OOB and ambulation.  Dispo: Eval for DC this afternoon if tachycardia resolves  Lori Stevenson PGY4

## 2019-07-24 NOTE — PROGRESS NOTE ADULT - SUBJECTIVE AND OBJECTIVE BOX
ANESTHESIA POSTOP CHECK    34y Female POSTOP DAY 1 S/P LAPAROSCOPIC LEFT SALPINGOOPHERECTOMY    Vital Signs Last 24 Hrs  T(C): 36.7 (24 Jul 2019 09:06), Max: 37 (23 Jul 2019 17:50)  T(F): 98.1 (24 Jul 2019 09:06), Max: 98.6 (23 Jul 2019 17:50)  HR: 96 (24 Jul 2019 09:06) (96 - 110)  BP: 124/67 (24 Jul 2019 09:06) (112/68 - 186/91)  BP(mean): 78 (23 Jul 2019 20:15) (72 - 91)  RR: 18 (24 Jul 2019 09:06) (11 - 40)  SpO2: 99% (24 Jul 2019 09:06) (93% - 100%)  I&O's Summary    23 Jul 2019 07:01  -  24 Jul 2019 07:00  --------------------------------------------------------  IN: 125 mL / OUT: 2100 mL / NET: -1975 mL    24 Jul 2019 07:01  -  24 Jul 2019 09:47  --------------------------------------------------------  IN: 0 mL / OUT: 500 mL / NET: -500 mL        [X ] NO APPARENT ANESTHESIA COMPLICATIONS      Comments:

## 2019-07-24 NOTE — PROGRESS NOTE ADULT - ASSESSMENT
Assessment/Plan: 34y female POD# 1, s/p laparoscopic LSO, SAKSHI, drainage of simple right cyst, EUA, pap, D&C (frozen=benign). EBL 50. Kept overnight for observation. Overnight with tachycardia, CBC stable but anemic Hct 26. Low suspicion for PE, however will keep on differential if tachycardia does not resolve.

## 2019-07-24 NOTE — DISCHARGE NOTE PROVIDER - PROVIDER TOKENS
FREE:[LAST:[JOSÉ SILVERIO],PHONE:[phone]:[(   [phone]    -],ADDRESS:[[facility]]]

## 2019-07-24 NOTE — DISCHARGE NOTE PROVIDER - HOSPITAL COURSE
Pt presented on ED on 7/21 with vaginal bleeding and pelvic pain. She was admitted to hospital on 7/22/19 with pelvic mass and pain for possible OR. She had not seen a doctor or GYN in many years, medical clearance was obtained. GYN ONC consult obtained. On 7/23 she went to OR for LSC LSO, SAKSHI, drainage of right cyst. Frozen benign. Please see operative report for further details, surgery was uncomplicated. She was discharged home on 7/24 in stable condition with follow up with GYN at [facility]. Pt presented on ED on 7/21 with vaginal bleeding and pelvic pain. She was admitted to hospital on 7/22/19 with pelvic mass and pain for possible OR. She had not seen a doctor or GYN in many years, medical clearance was obtained. GYN ONC consult obtained. On 7/23 she went to OR for LSC LSO, SAKSHI, drainage of right cyst. Frozen benign. Please see operative report for further details, surgery was uncomplicated. She was discharged home on 7/24 in stable condition with follow up with GYN at [facility].     Final pathology report - endometrial polyp , left benign ovarian cystadenoma and left mild salpingitis.

## 2019-07-24 NOTE — DISCHARGE NOTE NURSING/CASE MANAGEMENT/SOCIAL WORK - NSDCPNINST_GEN_ALL_CORE
call md for sign of infection (temp greater than 100.4f, redness at incision, pain not relieved by meds). call md for follow up appt. drink 9-13 eight oz. glasses of fluid daily. call md for sign of infection (temp greater than 100.4f, redness at incision, pain not relieved by meds). call md for follow up appt. drink 9-13 eight oz. glasses of fluid daily. patient provided with document from [facility] on salpingectomy to supplement abbreviated written MD instruction. D/C teaching done, patient and family verbalized understanding.

## 2019-07-24 NOTE — PROGRESS NOTE ADULT - SUBJECTIVE AND OBJECTIVE BOX
R4 Gyn Progress Note POD #1, HD#3    Subjective:     Pt seen and examined at bedside. Overnight patient required 2L NC due to desat while sleeping. Pain well controlled. Patient not yet OOB. Passing flatus. Tolerating clear liquid diet. Pt denies fever, chills, chest pain, SOB, nausea, vomiting, lightheadedness, dizziness.      Objective:  T(F): 98.4 (07-24-19 @ 06:30), Max: 98.6 (07-23-19 @ 17:50)  HR: 110 (07-24-19 @ 06:30) (90 - 110)  BP: 126/69 (07-24-19 @ 06:30) (112/68 - 186/91)  RR: 20 (07-24-19 @ 06:30) (11 - 40)  SpO2: 99% (07-24-19 @ 06:30) (93% - 100%)  Wt(kg): --  I&O's Summary    22 Jul 2019 07:01  -  23 Jul 2019 07:00  --------------------------------------------------------  IN: 1375 mL / OUT: 0 mL / NET: 1375 mL    23 Jul 2019 07:01  -  24 Jul 2019 06:52  --------------------------------------------------------  IN: 125 mL / OUT: 2100 mL / NET: -1975 mL      CAPILLARY BLOOD GLUCOSE      POCT Blood Glucose.: 195 mg/dL (23 Jul 2019 23:22)  POCT Blood Glucose.: 219 mg/dL (23 Jul 2019 22:03)  POCT Blood Glucose.: 101 mg/dL (23 Jul 2019 11:27)      MEDICATIONS  (STANDING):  acetaminophen   Tablet .. 975 milliGRAM(s) Oral every 6 hours  ascorbic acid 500 milliGRAM(s) Oral daily  dextrose 5%. 1000 milliLiter(s) (50 mL/Hr) IV Continuous <Continuous>  dextrose 50% Injectable 12.5 Gram(s) IV Push once  dextrose 50% Injectable 25 Gram(s) IV Push once  dextrose 50% Injectable 25 Gram(s) IV Push once  ferrous    sulfate 325 milliGRAM(s) Oral daily  heparin  Injectable 5000 Unit(s) SubCutaneous every 12 hours  insulin lispro (HumaLOG) corrective regimen sliding scale   SubCutaneous three times a day before meals  lactated ringers. 1000 milliLiter(s) (125 mL/Hr) IV Continuous <Continuous>  lisinopril 5 milliGRAM(s) Oral daily    MEDICATIONS  (PRN):  aluminum hydroxide/magnesium hydroxide/simethicone Suspension 30 milliLiter(s) Oral every 4 hours PRN Dyspepsia  calcium carbonate    500 mG (Tums) Chewable 3 Tablet(s) Chew every 6 hours PRN Dyspepsia  dextrose 40% Gel 15 Gram(s) Oral once PRN Blood Glucose LESS THAN 70 milliGRAM(s)/deciliter  glucagon  Injectable 1 milliGRAM(s) IntraMuscular once PRN Glucose LESS THAN 70 milligrams/deciliter  ibuprofen  Tablet. 600 milliGRAM(s) Oral every 6 hours PRN Moderate Pain (4 - 6)  metoclopramide Injectable 10 milliGRAM(s) IV Push every 6 hours PRN Nausea and/or Vomiting  oxyCODONE    IR 5 milliGRAM(s) Oral every 4 hours PRN Severe Pain (7 - 10)      Physical Exam:  Constitutional: NAD, A+O x3  CV: RRR  Lungs: clear to auscultation bilaterally  Abdomen: soft, appropriately tender, softly distended, no rebound or guarding  Incision: laparoscopic sites clean, dry, intact  Extremities: no lower extremity edema or calf tenderness bilaterally, venodynes in place    Labs:                        7.1    10.20 )-----------( 359      ( 24 Jul 2019 05:30 )             26.9   baso 0.1    eos 0.0    imm gran 0.4    lymph 17.3   mono 4.5    poly 77.7                         7.3    11.02 )-----------( 332      ( 24 Jul 2019 01:27 )             27.9   baso x      eos x      imm gran x      lymph x      mono x      poly x                            7.3    8.09  )-----------( 337      ( 23 Jul 2019 05:20 )             27.6   baso 0.4    eos 3.2    imm gran 0.5    lymph 37.0   mono 4.2    poly 54.7                         7.2    8.62  )-----------( 365      ( 22 Jul 2019 17:11 )             27.3   baso x      eos x      imm gran x      lymph x      mono x      poly x                            7.4    10.14 )-----------( 377      ( 22 Jul 2019 06:20 )             28.2   baso x      eos x      imm gran x      lymph x      mono x      poly x                            9.0    11.98 )-----------( 459      ( 21 Jul 2019 17:59 )             34.2   baso x      eos x      imm gran x      lymph x      mono x      poly x                            8.4    10.49 )-----------( 465      ( 21 Jul 2019 13:54 )             32.6   baso 0.5    eos 3.1    imm gran 0.3    lymph 28.8   mono 3.8    poly 63.5     07-24    137    |  102    |  6<L>   ----------------------------<  148<H>  4.4     |  23     |  0.60     Ca    9.3        24 Jul 2019 05:30          PT/INR - ( 22 Jul 2019 09:40 )   PT: 12.7 SEC;   INR: 1.11          PTT - ( 22 Jul 2019 09:40 )  PTT:28.5 SEC

## 2019-07-24 NOTE — DISCHARGE NOTE NURSING/CASE MANAGEMENT/SOCIAL WORK - NSDCDPATPORTLINK_GEN_ALL_CORE
You can access the [facility] Patient Portal, offered by [facility], by registering with the following website: [email or web address]

## 2019-07-24 NOTE — DISCHARGE NOTE PROVIDER - NSFOLLOWUPCLINICS_GEN_ALL_ED_FT
[facility]  OB/GYN & Surg  [phone] [place] [address]  Phone: [phone]  Fax:   Follow Up Time:

## 2019-07-26 LAB — CYTOLOGY SPEC DOC CYTO: SIGNIFICANT CHANGE UP

## 2019-07-30 PROBLEM — E11.9 TYPE 2 DIABETES MELLITUS WITHOUT COMPLICATIONS: Chronic | Status: ACTIVE | Noted: 2019-07-22

## 2019-07-30 PROBLEM — I10 ESSENTIAL (PRIMARY) HYPERTENSION: Chronic | Status: ACTIVE | Noted: 2019-07-22

## 2019-08-07 ENCOUNTER — APPOINTMENT (OUTPATIENT)
Dept: OBGYN | Facility: HOSPITAL | Age: 34
End: 2019-08-07

## 2021-08-16 NOTE — CONSULT NOTE ADULT - REASON FOR ADMISSION
Changed patients brief and cleaned him up, he tolerated it well and was covered up to keep warm.      Marika Del Toro  08/16/21 0998     left adnexal mass

## 2022-05-16 NOTE — H&P ADULT - NSHPREVIEWOFSYSTEMS_GEN_ALL_CORE
Pt presents to ED via private auto with c/o toe injury, onset 0500 today. Pt states she dropped a bag of ice on her toe. Bruising noted to right second toe. Pt able to ambulate without assist. Pt afebrile, vitals stable.       Meredith Munoz RN  05/15/22 9805 REVIEW OF SYSTEMS  General: denies fevers, chills, tiredness  Skin/Breast: denies breast pain  Respiratory and Thorax: denies shortness of breath, denies cough  Cardiovascular: denies chest pain and denies palpitations  Gastrointestinal: reports abdominal pain, denies nausea/ vomiting	  Genitourinary: reports vaginal bleeding; denies dysuria, increased urinary frequency, urgency	  Constitutional, Cardiovascular, Respiratory, Gastrointestinal, Genitourinary, Musculoskeletal and Integumentary review of systems are normal except as noted.

## 2022-08-15 ENCOUNTER — EMERGENCY (EMERGENCY)
Facility: HOSPITAL | Age: 37
LOS: 1 days | Discharge: HOME | End: 2022-08-15
Attending: EMERGENCY MEDICINE | Admitting: EMERGENCY MEDICINE

## 2022-08-15 VITALS
SYSTOLIC BLOOD PRESSURE: 169 MMHG | DIASTOLIC BLOOD PRESSURE: 89 MMHG | HEART RATE: 109 BPM | RESPIRATION RATE: 18 BRPM | HEIGHT: 62 IN | OXYGEN SATURATION: 100 % | TEMPERATURE: 98 F

## 2022-08-15 DIAGNOSIS — Z90.721 ACQUIRED ABSENCE OF OVARIES, UNILATERAL: Chronic | ICD-10-CM

## 2022-08-15 DIAGNOSIS — Z98.890 OTHER SPECIFIED POSTPROCEDURAL STATES: Chronic | ICD-10-CM

## 2022-08-15 LAB
ALBUMIN SERPL ELPH-MCNC: 4.6 G/DL — SIGNIFICANT CHANGE UP (ref 3.3–5)
ALP SERPL-CCNC: 76 U/L — SIGNIFICANT CHANGE UP (ref 40–120)
ALT FLD-CCNC: 28 U/L — SIGNIFICANT CHANGE UP (ref 4–33)
ANION GAP SERPL CALC-SCNC: 11 MMOL/L — SIGNIFICANT CHANGE UP (ref 7–14)
ANISOCYTOSIS BLD QL: SLIGHT — SIGNIFICANT CHANGE UP
APPEARANCE UR: ABNORMAL
AST SERPL-CCNC: 23 U/L — SIGNIFICANT CHANGE UP (ref 4–32)
BACTERIA # UR AUTO: ABNORMAL
BASOPHILS # BLD AUTO: 0 K/UL — SIGNIFICANT CHANGE UP (ref 0–0.2)
BASOPHILS NFR BLD AUTO: 0 % — SIGNIFICANT CHANGE UP (ref 0–2)
BILIRUB SERPL-MCNC: 0.6 MG/DL — SIGNIFICANT CHANGE UP (ref 0.2–1.2)
BILIRUB UR-MCNC: NEGATIVE — SIGNIFICANT CHANGE UP
BLD GP AB SCN SERPL QL: NEGATIVE — SIGNIFICANT CHANGE UP
BUN SERPL-MCNC: 11 MG/DL — SIGNIFICANT CHANGE UP (ref 7–23)
CALCIUM SERPL-MCNC: 9.7 MG/DL — SIGNIFICANT CHANGE UP (ref 8.4–10.5)
CHLORIDE SERPL-SCNC: 106 MMOL/L — SIGNIFICANT CHANGE UP (ref 98–107)
CO2 SERPL-SCNC: 22 MMOL/L — SIGNIFICANT CHANGE UP (ref 22–31)
COLOR SPEC: YELLOW — SIGNIFICANT CHANGE UP
CREAT SERPL-MCNC: 0.63 MG/DL — SIGNIFICANT CHANGE UP (ref 0.5–1.3)
DIFF PNL FLD: ABNORMAL
EGFR: 116 ML/MIN/1.73M2 — SIGNIFICANT CHANGE UP
ELLIPTOCYTES BLD QL SMEAR: SLIGHT — SIGNIFICANT CHANGE UP
EOSINOPHIL # BLD AUTO: 0 K/UL — SIGNIFICANT CHANGE UP (ref 0–0.5)
EOSINOPHIL NFR BLD AUTO: 0 % — SIGNIFICANT CHANGE UP (ref 0–6)
EPI CELLS # UR: 7 /HPF — HIGH (ref 0–5)
FERRITIN SERPL-MCNC: 25 NG/ML — SIGNIFICANT CHANGE UP (ref 15–150)
FLUAV AG NPH QL: SIGNIFICANT CHANGE UP
FLUBV AG NPH QL: SIGNIFICANT CHANGE UP
GLUCOSE SERPL-MCNC: 123 MG/DL — HIGH (ref 70–99)
GLUCOSE UR QL: NEGATIVE — SIGNIFICANT CHANGE UP
HCT VFR BLD CALC: 24.7 % — LOW (ref 34.5–45)
HGB BLD-MCNC: 6.5 G/DL — CRITICAL LOW (ref 11.5–15.5)
HYALINE CASTS # UR AUTO: 4 /LPF — SIGNIFICANT CHANGE UP (ref 0–7)
HYPOCHROMIA BLD QL: SIGNIFICANT CHANGE UP
IANC: 5.75 K/UL — SIGNIFICANT CHANGE UP (ref 1.8–7.4)
INR BLD: 1.13 RATIO — SIGNIFICANT CHANGE UP (ref 0.88–1.16)
IRON SATN MFR SERPL: 22 UG/DL — LOW (ref 30–160)
IRON SATN MFR SERPL: 5 % — LOW (ref 14–50)
KETONES UR-MCNC: NEGATIVE — SIGNIFICANT CHANGE UP
LEUKOCYTE ESTERASE UR-ACNC: ABNORMAL
LIDOCAIN IGE QN: 30 U/L — SIGNIFICANT CHANGE UP (ref 7–60)
LYMPHOCYTES # BLD AUTO: 2.91 K/UL — SIGNIFICANT CHANGE UP (ref 1–3.3)
LYMPHOCYTES # BLD AUTO: 30.2 % — SIGNIFICANT CHANGE UP (ref 13–44)
MAGNESIUM SERPL-MCNC: 2.2 MG/DL — SIGNIFICANT CHANGE UP (ref 1.6–2.6)
MCHC RBC-ENTMCNC: 20.4 PG — LOW (ref 27–34)
MCHC RBC-ENTMCNC: 26.3 GM/DL — LOW (ref 32–36)
MCV RBC AUTO: 77.4 FL — LOW (ref 80–100)
MICROCYTES BLD QL: SLIGHT — SIGNIFICANT CHANGE UP
MONOCYTES # BLD AUTO: 0.25 K/UL — SIGNIFICANT CHANGE UP (ref 0–0.9)
MONOCYTES NFR BLD AUTO: 2.6 % — SIGNIFICANT CHANGE UP (ref 2–14)
NEUTROPHILS # BLD AUTO: 6.39 K/UL — SIGNIFICANT CHANGE UP (ref 1.8–7.4)
NEUTROPHILS NFR BLD AUTO: 66.4 % — SIGNIFICANT CHANGE UP (ref 43–77)
NITRITE UR-MCNC: NEGATIVE — SIGNIFICANT CHANGE UP
PH UR: 6 — SIGNIFICANT CHANGE UP (ref 5–8)
PLAT MORPH BLD: NORMAL — SIGNIFICANT CHANGE UP
PLATELET # BLD AUTO: 522 K/UL — HIGH (ref 150–400)
PLATELET COUNT - ESTIMATE: ABNORMAL
POIKILOCYTOSIS BLD QL AUTO: SLIGHT — SIGNIFICANT CHANGE UP
POLYCHROMASIA BLD QL SMEAR: SIGNIFICANT CHANGE UP
POTASSIUM SERPL-MCNC: 4.6 MMOL/L — SIGNIFICANT CHANGE UP (ref 3.5–5.3)
POTASSIUM SERPL-SCNC: 4.6 MMOL/L — SIGNIFICANT CHANGE UP (ref 3.5–5.3)
PROT SERPL-MCNC: 7.3 G/DL — SIGNIFICANT CHANGE UP (ref 6–8.3)
PROT UR-MCNC: ABNORMAL
PROTHROM AB SERPL-ACNC: 13.1 SEC — SIGNIFICANT CHANGE UP (ref 10.5–13.4)
RBC # BLD: 3.19 M/UL — LOW (ref 3.8–5.2)
RBC # FLD: 20 % — HIGH (ref 10.3–14.5)
RBC BLD AUTO: ABNORMAL
RBC CASTS # UR COMP ASSIST: 3 /HPF — SIGNIFICANT CHANGE UP (ref 0–4)
RH IG SCN BLD-IMP: POSITIVE — SIGNIFICANT CHANGE UP
RSV RNA NPH QL NAA+NON-PROBE: SIGNIFICANT CHANGE UP
SARS-COV-2 RNA SPEC QL NAA+PROBE: SIGNIFICANT CHANGE UP
SODIUM SERPL-SCNC: 139 MMOL/L — SIGNIFICANT CHANGE UP (ref 135–145)
SP GR SPEC: 1.02 — SIGNIFICANT CHANGE UP (ref 1.01–1.05)
TIBC SERPL-MCNC: 478 UG/DL — HIGH (ref 220–430)
UIBC SERPL-MCNC: 456 UG/DL — HIGH (ref 110–370)
UROBILINOGEN FLD QL: ABNORMAL
VARIANT LYMPHS # BLD: 0.8 % — SIGNIFICANT CHANGE UP (ref 0–6)
WBC # BLD: 9.62 K/UL — SIGNIFICANT CHANGE UP (ref 3.8–10.5)
WBC # FLD AUTO: 9.62 K/UL — SIGNIFICANT CHANGE UP (ref 3.8–10.5)
WBC UR QL: 37 /HPF — HIGH (ref 0–5)

## 2022-08-15 PROCEDURE — 76857 US EXAM PELVIC LIMITED: CPT | Mod: 26

## 2022-08-15 PROCEDURE — 99243 OFF/OP CNSLTJ NEW/EST LOW 30: CPT | Mod: GC

## 2022-08-15 PROCEDURE — G1004: CPT

## 2022-08-15 PROCEDURE — 76830 TRANSVAGINAL US NON-OB: CPT | Mod: 26

## 2022-08-15 PROCEDURE — 99218: CPT

## 2022-08-15 PROCEDURE — 74177 CT ABD & PELVIS W/CONTRAST: CPT | Mod: 26,ME

## 2022-08-15 RX ORDER — IRON SUCROSE 20 MG/ML
200 INJECTION, SOLUTION INTRAVENOUS ONCE
Refills: 0 | Status: COMPLETED | OUTPATIENT
Start: 2022-08-15 | End: 2022-08-15

## 2022-08-15 RX ORDER — ACETAMINOPHEN 500 MG
1000 TABLET ORAL ONCE
Refills: 0 | Status: COMPLETED | OUTPATIENT
Start: 2022-08-15 | End: 2022-08-15

## 2022-08-15 RX ORDER — SODIUM CHLORIDE 9 MG/ML
1000 INJECTION, SOLUTION INTRAVENOUS ONCE
Refills: 0 | Status: COMPLETED | OUTPATIENT
Start: 2022-08-15 | End: 2022-08-15

## 2022-08-15 RX ADMIN — Medication 400 MILLIGRAM(S): at 14:01

## 2022-08-15 RX ADMIN — IRON SUCROSE 200 MILLIGRAM(S): 20 INJECTION, SOLUTION INTRAVENOUS at 18:20

## 2022-08-15 RX ADMIN — SODIUM CHLORIDE 1000 MILLILITER(S): 9 INJECTION, SOLUTION INTRAVENOUS at 14:56

## 2022-08-15 RX ADMIN — IRON SUCROSE 110 MILLIGRAM(S): 20 INJECTION, SOLUTION INTRAVENOUS at 17:08

## 2022-08-15 NOTE — CONSULT NOTE ADULT - ATTENDING COMMENTS
Late Entry 2/2 pt care.     When I went to evaluate pt, pt stated that she had "no pain".  Pt stated that initially she had left sided abdominal pain but it has resolved.  Pt denies n/v.  Pt states she was told in July that she had a cyst that was about 7cm in [facility].  Pt states she was advised to come to [facility] for surgery.  I informed pt of the USN findings today and she is noted to have a complex cyst ~10cm today.   Pt received IV tylenol >6hrs prior to me seeing her.  On exam pt with no tenderness to palpation in all quadrants,  no rebound, no guarding.  Pt appeared comfortable and I observed her walking around in the ER.  Low suspicion for torsion.  Pt also reports her menses skipping 3-4 months and then she has heavy menstrual bleeding when she gets her menses.  Pt considering a hysterectomy.  Pt being admitted to the CDU for blood transfusion. If pt with worsening pain will reevaluate pt overnight.  If no pain overnight,  plan to submit pts name and information to [facility] booking clinic.   Recommend emb and pap as outpatient prior to any planned surgery.      Norma Taylor MD

## 2022-08-15 NOTE — ED PROVIDER NOTE - CLINICAL SUMMARY MEDICAL DECISION MAKING FREE TEXT BOX
37f p/w lower abd pain since yesterday, tender diffusely, no other symtoms, generally well appearing, vs noted. Plan for labs, ua/cx, ct a/p/us, eval for elec disturbance, organ dysfunction, infections, obstruction, pain control.

## 2022-08-15 NOTE — CONSULT NOTE ADULT - SUBJECTIVE AND OBJECTIVE BOX
JEANNE ASHRAF  37y  Female [id number]    HPI:      Name of GYN Physician:     ObHx:  GynHx: Denies fibroids, cysts, endometriosis, STI's, Abnormal pap smears   PMHx:  SurgHx:  Meds:  Allergies:  Social History:  Denies smoking use, drug use, alcohol use, +occasional social alcohol use    Vital Signs Last 24 Hrs  T(C): 37.3 (15 Aug 2022 17:20), Max: 37.3 (15 Aug 2022 17:20)  T(F): 99.1 (15 Aug 2022 17:20), Max: 99.1 (15 Aug 2022 17:20)  HR: 100 (15 Aug 2022 17:20) (100 - 109)  BP: 141/82 (15 Aug 2022 17:20) (141/82 - 169/89)  BP(mean): --  RR: 18 (15 Aug 2022 17:20) (18 - 18)  SpO2: 100% (15 Aug 2022 17:20) (100% - 100%)    Parameters below as of 15 Aug 2022 17:20  Patient On (Oxygen Delivery Method): room air        Physical Exam:   General: sitting comfortably in bed, NAD   HEENT: neck supple, full ROM  CV: RR S1S2 no m/r/g  Lungs: CTA b/l, good air flow b/l   Back: No CVA tenderness  Abd: Soft, non-tender, non-distended.  Bowel sounds present.    :  No bleeding on pad. External labia wnl. Bimanual exam with no cervical motion tenderness, uterus wnl, adnexa non palpable b/l.  Cervix closed vs. Cervix dilated *** cm   Speculum Exam: No active bleeding from os. Physiologic discharge.    Ext: non-tender b/l, no edema     LABS:                              6.5    9.62  )-----------( 522      ( 15 Aug 2022 13:56 )             24.7     08-15    139  |  106  |  11  ----------------------------<  123<H>  4.6   |  22  |  0.63    Ca    9.7      15 Aug 2022 13:56  Mg     2.20     08-15    TPro  7.3  /  Alb  4.6  /  TBili  0.6  /  DBili  x   /  AST  23  /  ALT  28  /  AlkPhos  76  08-15    I&O's Detail    PT/INR - ( 15 Aug 2022 16:10 )   PT: 13.1 sec;   INR: 1.13 ratio           Urinalysis Basic - ( 15 Aug 2022 13:52 )    Color: Yellow / Appearance: Slightly Turbid / S.021 / pH: x  Gluc: x / Ketone: Negative  / Bili: Negative / Urobili: 3 mg/dL   Blood: x / Protein: 30 mg/dL / Nitrite: Negative   Leuk Esterase: Large / RBC: 3 /HPF / WBC 37 /HPF   Sq Epi: x / Non Sq Epi: 7 /HPF / Bacteria: Moderate        RADIOLOGY & ADDITIONAL STUDIES: JEANNE ASHRAF  37y  Female [id number]    HPI:  38 y/o  LMP 8/5 presenting to ED due to sharp left-sided abdominal pain for the past two days. On pelvic ultrasound found to have ~10cm ovarian cyst, Gyn consulted for management.    At time of evaluation, patient stating that she is in 0/10 pain. Had recently eaten small amounts of food. Last received IV Tylenol at 2pm. Denied any course of nausea/vomiting for the past few days, denied fevers/chills, abnormal vaginal bleeding and abnormal vaginal discharge. Otherwise well. Patient states that she initially started having this pain about 1.5 years ago. Mostly left-sided however also described as diffusely across lower quadrants. Was told by doctors in [facility] that she had a cyst, and was told to follow-up in the [place] for possible surgery as she had had previous surgery done at [facility]. Returned from [facility] on Monday. Of note, patient seen in ED and admitted to Gyn service in 2019 with complaint of L-sided abdominal pain, found to have an ovarian cyst and underwent a laparoscopic LSO and lysis of adhesions, with pathology demonstrating benign cystadenoma. Patient states that she did not follow up in the [place] afterwards. Since then has been traveling between [facility] and the [place].    Patient states that she has additionally suffered from heavy irregular periods. Now having periods every 3-4 months with heavy bleeding - at bedside patient showing video of amount of heavy bleeding including container of unspecified blood. States she feels lightheaded and dizzy at time of periods, now denying symptoms. Last period ended  with no current bleeding. Due to heavy bleeding, recently received blood transfusion in [facility].    Name of GYN Physician: none    ObHx:  FT (), abortionx3  GynHx: Ovarian cyst; Denies fibroids, endometriosis, STI's, Abnormal pap smears   PMHx: DM, HTN  SurgHx: LSC LSO and SAKSHI ()  Meds: Metformin qd, Captopril qd  Allergies: NKDA  Social History:  Denies smoking use, drug use, alcohol use.    Vital Signs Last 24 Hrs  T(C): 37.3 (15 Aug 2022 17:20), Max: 37.3 (15 Aug 2022 17:20)  T(F): 99.1 (15 Aug 2022 17:20), Max: 99.1 (15 Aug 2022 17:20)  HR: 100 (15 Aug 2022 17:20) (100 - 109)  BP: 141/82 (15 Aug 2022 17:20) (141/82 - 169/89)  BP(mean): --  RR: 18 (15 Aug 2022 17:20) (18 - 18)  SpO2: 100% (15 Aug 2022 17:20) (100% - 100%)    Parameters below as of 15 Aug 2022 17:20  Patient On (Oxygen Delivery Method): room air      Physical Exam:   General: sitting comfortably in bed, NAD   CV: RR S1S2 no m/r/g  Lungs: CTA b/l, good air flow b/l   Back: No CVA tenderness  Abd: Soft, non-tender, non-distended.  Bowel sounds present.    :  No bleeding on pad. External labia wnl. Bimanual exam with no cervical motion tenderness, uterus wnl, adnexa non palpable b/l; limited by body habitus. Cervix closed.  Speculum Exam: No active bleeding from os. Physiologic discharge.    Ext: non-tender b/l, no edema     Chaperoned by Soraida Siegel RN    LABS:             6.5    9.62  )-----------( 522      ( 15 Aug 2022 13:56 )             24.7     08-15    139  |  106  |  11  ----------------------------<  123<H>  4.6   |  22  |  0.63    Ca    9.7      15 Aug 2022 13:56  Mg     2.20     08-15    TPro  7.3  /  Alb  4.6  /  TBili  0.6  /  DBili  x   /  AST  23  /  ALT  28  /  AlkPhos  76  08-15    I&O's Detail    PT/INR - ( 15 Aug 2022 16:10 )   PT: 13.1 sec;   INR: 1.13 ratio           Urinalysis Basic - ( 15 Aug 2022 13:52 )    Color: Yellow / Appearance: Slightly Turbid / S.021 / pH: x  Gluc: x / Ketone: Negative  / Bili: Negative / Urobili: 3 mg/dL   Blood: x / Protein: 30 mg/dL / Nitrite: Negative   Leuk Esterase: Large / RBC: 3 /HPF / WBC 37 /HPF   Sq Epi: x / Non Sq Epi: 7 /HPF / Bacteria: Moderate    RADIOLOGY & ADDITIONAL STUDIES:  < from: CT Abdomen and Pelvis w/ IV Cont (08.15.22 @ 19:27) >    ACC: [id number] EXAM:  CT ABDOMEN AND PELVIS IC                          PROCEDURE DATE:  08/15/2022          INTERPRETATION:  CLINICAL INFORMATION: Abdominal pain. Adenomyosis.    COMPARISON: Pelvic ultrasound of earlier in the day and MR pelvis of   2019.    CONTRAST/COMPLICATIONS:  IV Contrast: Omnipaque 350  63 cc administered   7 cc discarded  Oral Contrast: NONE  Complications: None reported at time of study completion    PROCEDURE:  CT of the Abdomen and Pelvis was performed.  Sagittal and coronal reformats were performed.    FINDINGS:  LOWER CHEST: Within normal limits.    LIVER: Within normal limits.  BILE DUCTS: Normal caliber.  GALLBLADDER: Within normal limits.  SPLEEN: Within normal limits.  PANCREAS: Within normal limits.  ADRENALS: Within normal limits.  KIDNEYS/URETERS: Punctate nonobstructive left lower pole calculus. No   hydronephrosis. Small hypodense lesion upper pole right kidney too small   to characterize measuring approximately 1.3 cm but likely representing a   cyst. Additional small left renal hypodensities too small to characterize    BLADDER: Within normal limits.  REPRODUCTIVE ORGANS: Enlarged, globular uterus with tiny low-attenuation   foci consistent with known adenomyosis. Large septated right adnexal cyst   measuring up to 8.8 cm. This is inseparable from the base of the cecum.  BOWEL: No bowel obstruction. Appendix is normal.  PERITONEUM: No ascites.  VESSELS: Within normal limits.  RETROPERITONEUM/LYMPH NODES: Enlarged right lower quadrantmesenteric   lymph nodes.  ABDOMINAL WALL: Small fat-containing umbilical hernia.  BONES: Within normal limits.    IMPRESSION:  Uterine adenomyosis and large right adnexal cyst, better evaluated on   same day pelvic ultrasound.  Enlarged right lower quadrant mesenteric lymph nodes. Differential   diagnosis includes mesenteric adenitis line. Nonobstructive left renal   calculus    --- End of Report ---          TONY WHITEHEAD MD; Resident Radiology  This document has been electronically signed.  GERMAN WILEY MD; Attending Radiologist  This document has been electronically signed. Aug 15 2022  7:44PM    < end of copied text >    < from: US Transvaginal (08.15.22 @ 15:47) >  ACC: [id number] EXAM:  US TRANSVAGINAL                          PROCEDURE DATE:  08/15/2022          INTERPRETATION:  CLINICAL INFORMATION: Right lower pelvic pain. History   of left ovarian cyst adenofibroma    LMP: 2022    COMPARISON: MRI pelvis 2019    TECHNIQUE:  Endovaginal and transabdominal pelvic sonogram.    FINDINGS:  Uterus: 12.7 cm x 8.8 cm x 8.7 cm. Enlarged heterogeneous uterus   compatible with adenomyosis.    Endometrium: 7 mm. Indistinct endometrial myometrial junction    Right ovary: Seen transabdominally only. Mildly complex 9.5 x 6.7 x 10.2   cm cyst contains mural nodularity and thin internal septations    Left ovary: Left oophorectomy    Fluid: None.    IMPRESSION:    Uterine adenomyosis    Increased size of right adnexal complex cystic lesion now measuring 10.2   cm. Recommend nonemergent pelvic MRI to further assess        --- End of Report ---        STEVEN ESQUEDA MD; Attending Radiologist  This document has been electronically signed. Aug 15 2022  4:31PM    < end of copied text >

## 2022-08-15 NOTE — CONSULT NOTE ADULT - ASSESSMENT
***INCOMPLETE NOTE PT TO BE SEEN*** 38 y/o  LMP  presenting to ED due to sharp left-sided abdominal pain for the past two days, now resolved; with finding of ~10cm ovarian cyst, without current clinical concern for torsion. Patient currently in stable condition.    #Ovarian Cyst  - TVUS (8/15): mildly complex 9.5 x 6.7 x 10.2 cm cyst contains with mural nodularity and thin internal septations, uterine adenomyosis  - CTAP (8/15): uterine adenomyosis and large R adnexal cyst up to 8.8cm inseparable from base of cecum  - Upon physical exam, patient not in any pain; ambulating without difficulty and describing pain as 0/10. Recently ate with no difficulties. Doing well.  - Pt in stable condition at this time; no acute Gyn intervention. Primary management per ED  - Counseled pt on possibility of surgery while she is in the [place] for [place]; to go to [facility] booking clinic and pre-operative appointments to be medically optimized to receive surgery. Emphasized importance of follow-up with clinic to obtain surgery.  - To email [facility] for booking clinic appt for pt  - Gyn team to re-evaluate pt in AM    #Vaginal Bleeding  - Pt H/H upon presentation 6.5/24.7  - Given pt stable vitals and overall stable appearance, agree w/ plan for CDU and blood transfusion  - No acute Gyn intervention as physical exam benign and not currently experiencing vaginal bleeding    Patient seen and discussed with service attending Dr Brandon Guerrero  PGY-2

## 2022-08-15 NOTE — ED PROVIDER NOTE - NS ED ATTENDING STATEMENT MOD
This was a shared visit with the DILMA. I reviewed and verified the documentation and independently performed the documented:

## 2022-08-15 NOTE — ED PROVIDER NOTE - PHYSICAL EXAMINATION
GEN - NAD; well appearing; A+O x3   HEAD - NC/AT   EYES- PERRL, EOMI  ENT: Airway patent, mmm, Oral cavity and pharynx normal. No inflammation, swelling, exudate, or lesions.    NECK: Neck supple  PULMONARY - CTA b/l, symmetric breath sounds.   CARDIAC -s1s2, RRR, no M,G,R  ABDOMEN - +BS, ND, +ttp diffusely  BACK - no CVA tenderness, Normal  spine   EXTREMITIES - FROM, symmetric pulses, capillary refill < 2 seconds, no edema   SKIN - no rash or bruising   NEUROLOGIC - alert, speech clear, no focal deficits  PSYCH -nl mood/affect, nl insight.

## 2022-08-15 NOTE — ED PROVIDER NOTE - PROGRESS NOTE DETAILS
CHELLY Presley: pt reassessed states her pain is better controlled now than before. Mild tenderness to palpation to LLQ , no guarding or rebound on exam. Pt will be sent for repeat imaging as there was no comment on arterial/venous flow on original US. Called US tech to expedite study.     Case d/w OB who will be in to see patient. Case d/w CDU PA who accepts patient to CDU.

## 2022-08-15 NOTE — ED PROVIDER NOTE - OBJECTIVE STATEMENT
37f presents to ed with abd pain. Located across lower abd but worse on left, constant, has become more severe, started yesterday, sudden in onset initially. No associated fevers, chills, ha, cp, sob, cough, vomiting, diarrhea, constipation, dysuria, hematuria. She reports history of surgery to remove ovary on left side prior though she is unsure what cause was.

## 2022-08-15 NOTE — ED PROVIDER NOTE - NSICDXPASTSURGICALHX_GEN_ALL_CORE_FT
PAST SURGICAL HISTORY:  Four previous induced terminations of pregnancy     S/P left oophorectomy

## 2022-08-16 VITALS
TEMPERATURE: 99 F | HEART RATE: 96 BPM | SYSTOLIC BLOOD PRESSURE: 131 MMHG | OXYGEN SATURATION: 100 % | RESPIRATION RATE: 14 BRPM | DIASTOLIC BLOOD PRESSURE: 74 MMHG

## 2022-08-16 LAB
APPEARANCE UR: CLEAR — SIGNIFICANT CHANGE UP
BASOPHILS # BLD AUTO: 0.03 K/UL — SIGNIFICANT CHANGE UP (ref 0–0.2)
BASOPHILS NFR BLD AUTO: 0.3 % — SIGNIFICANT CHANGE UP (ref 0–2)
BILIRUB UR-MCNC: NEGATIVE — SIGNIFICANT CHANGE UP
COLOR SPEC: YELLOW — SIGNIFICANT CHANGE UP
CULTURE RESULTS: SIGNIFICANT CHANGE UP
DIFF PNL FLD: ABNORMAL
EOSINOPHIL # BLD AUTO: 0.14 K/UL — SIGNIFICANT CHANGE UP (ref 0–0.5)
EOSINOPHIL NFR BLD AUTO: 1.2 % — SIGNIFICANT CHANGE UP (ref 0–6)
GLUCOSE UR QL: NEGATIVE — SIGNIFICANT CHANGE UP
HCT VFR BLD CALC: 29.4 % — LOW (ref 34.5–45)
HGB BLD-MCNC: 7.9 G/DL — LOW (ref 11.5–15.5)
IANC: 7.5 K/UL — HIGH (ref 1.8–7.4)
IMM GRANULOCYTES NFR BLD AUTO: 0.9 % — SIGNIFICANT CHANGE UP (ref 0–1.5)
KETONES UR-MCNC: ABNORMAL
LEUKOCYTE ESTERASE UR-ACNC: NEGATIVE — SIGNIFICANT CHANGE UP
LYMPHOCYTES # BLD AUTO: 26.8 % — SIGNIFICANT CHANGE UP (ref 13–44)
LYMPHOCYTES # BLD AUTO: 3 K/UL — SIGNIFICANT CHANGE UP (ref 1–3.3)
MCHC RBC-ENTMCNC: 21.3 PG — LOW (ref 27–34)
MCHC RBC-ENTMCNC: 26.9 GM/DL — LOW (ref 32–36)
MCV RBC AUTO: 79.2 FL — LOW (ref 80–100)
MONOCYTES # BLD AUTO: 0.44 K/UL — SIGNIFICANT CHANGE UP (ref 0–0.9)
MONOCYTES NFR BLD AUTO: 3.9 % — SIGNIFICANT CHANGE UP (ref 2–14)
NEUTROPHILS # BLD AUTO: 7.5 K/UL — HIGH (ref 1.8–7.4)
NEUTROPHILS NFR BLD AUTO: 66.9 % — SIGNIFICANT CHANGE UP (ref 43–77)
NITRITE UR-MCNC: NEGATIVE — SIGNIFICANT CHANGE UP
NRBC # BLD: 0 /100 WBCS — SIGNIFICANT CHANGE UP
NRBC # FLD: 0.08 K/UL — HIGH
PH UR: 6.5 — SIGNIFICANT CHANGE UP (ref 5–8)
PLATELET # BLD AUTO: 470 K/UL — HIGH (ref 150–400)
PROT UR-MCNC: ABNORMAL
RBC # BLD: 3.71 M/UL — LOW (ref 3.8–5.2)
RBC # FLD: 18.7 % — HIGH (ref 10.3–14.5)
SP GR SPEC: 1.03 — SIGNIFICANT CHANGE UP (ref 1.01–1.05)
SPECIMEN SOURCE: SIGNIFICANT CHANGE UP
UROBILINOGEN FLD QL: ABNORMAL
WBC # BLD: 11.21 K/UL — HIGH (ref 3.8–10.5)
WBC # FLD AUTO: 11.21 K/UL — HIGH (ref 3.8–10.5)

## 2022-08-16 PROCEDURE — 99217: CPT

## 2022-08-16 RX ORDER — DEXTROSE 50 % IN WATER 50 %
25 SYRINGE (ML) INTRAVENOUS ONCE
Refills: 0 | Status: DISCONTINUED | OUTPATIENT
Start: 2022-08-16 | End: 2022-08-18

## 2022-08-16 RX ORDER — DOCUSATE SODIUM 100 MG
1 CAPSULE ORAL
Qty: 28 | Refills: 0
Start: 2022-08-16 | End: 2022-08-29

## 2022-08-16 RX ORDER — CAPTOPRIL 12.5 MG/1
12.5 TABLET ORAL DAILY
Refills: 0 | Status: DISCONTINUED | OUTPATIENT
Start: 2022-08-16 | End: 2022-08-18

## 2022-08-16 RX ORDER — SODIUM CHLORIDE 9 MG/ML
1000 INJECTION, SOLUTION INTRAVENOUS
Refills: 0 | Status: DISCONTINUED | OUTPATIENT
Start: 2022-08-16 | End: 2022-08-18

## 2022-08-16 RX ORDER — GLUCAGON INJECTION, SOLUTION 0.5 MG/.1ML
1 INJECTION, SOLUTION SUBCUTANEOUS ONCE
Refills: 0 | Status: DISCONTINUED | OUTPATIENT
Start: 2022-08-16 | End: 2022-08-18

## 2022-08-16 RX ORDER — FERROUS SULFATE 325(65) MG
1 TABLET ORAL
Qty: 14 | Refills: 0
Start: 2022-08-16 | End: 2022-08-29

## 2022-08-16 RX ORDER — DEXTROSE 50 % IN WATER 50 %
15 SYRINGE (ML) INTRAVENOUS ONCE
Refills: 0 | Status: DISCONTINUED | OUTPATIENT
Start: 2022-08-16 | End: 2022-08-18

## 2022-08-16 RX ORDER — KETOROLAC TROMETHAMINE 30 MG/ML
15 SYRINGE (ML) INJECTION ONCE
Refills: 0 | Status: DISCONTINUED | OUTPATIENT
Start: 2022-08-16 | End: 2022-08-16

## 2022-08-16 RX ORDER — INSULIN LISPRO 100/ML
VIAL (ML) SUBCUTANEOUS
Refills: 0 | Status: DISCONTINUED | OUTPATIENT
Start: 2022-08-16 | End: 2022-08-18

## 2022-08-16 RX ORDER — DEXTROSE 50 % IN WATER 50 %
12.5 SYRINGE (ML) INTRAVENOUS ONCE
Refills: 0 | Status: DISCONTINUED | OUTPATIENT
Start: 2022-08-16 | End: 2022-08-18

## 2022-08-16 RX ADMIN — CAPTOPRIL 12.5 MILLIGRAM(S): 12.5 TABLET ORAL at 06:32

## 2022-08-16 RX ADMIN — Medication 15 MILLIGRAM(S): at 04:12

## 2022-08-16 RX ADMIN — Medication 15 MILLIGRAM(S): at 03:57

## 2022-08-16 NOTE — ED CDU PROVIDER INITIAL DAY NOTE - MEDICAL DECISION MAKING DETAILS
38 y/o F with hx of anemia (multiple transfusion) most recently 2 wks ago in [facility], ovarian cyst presenting with c/o lower abdominal pain. In ED labs demonstrate hemoglobin of 6.5. CT A/p demonstrates Large septated right adnexal cyst measuring up to 8.8 cm. patient was evaluated by GYN no acute recommendations advised. patient accepted to CDU for blood transfusion

## 2022-08-16 NOTE — ED CDU PROVIDER DISPOSITION NOTE - NSFOLLOWUPINSTRUCTIONS_ED_ALL_ED_FT
Advance activity as tolerated.  Continue all previously prescribed medications as directed unless otherwise instructed.  Take Colace 100 mg twice a day as needed for constipation.  Take Iron supplements as prescribed.  Take Motrin (also sold as Advil or Ibuprofen) 400  mg (two 00 mg over the counter pills) every 8 hours as needed for moderate pain or fevers-- take with food.  Follow up with your primary care physician in 48-72 hours and OB/GYN (call [phone] to make an appointment with the OB/GYN clinic to be evaluated tomorrow) - bring copies of your results.  Return to the ER for worsening or persistent symptoms, including but not limited to worsening/persistent pain, lightheadedness, chest pain, shortness of breath, passing out, heavy vaginal bleeding requiring more than or equal to 2 pads in 1 hour, and/or ANY NEW OR CONCERNING SYMPTOMS. If you have issues obtaining follow up, please call: [phone] to obtain a doctor or specialist who takes your insurance in your area.  You may call [phone] to make an appointment with the internal medicine clinic.

## 2022-08-16 NOTE — PROGRESS NOTE ADULT - SUBJECTIVE AND OBJECTIVE BOX
HD#2    Patient seen and examined at bedside. Overnight, patient received 1uPRBC, states that she feels better since receiving transfusion. Lower abdominal pain has improved s/p 1 dose of Toradol this morning. She has been ambulating without difficulty, denies lightheadedness, dizziness, chest pain, or SOB. Tolerating a regular diet, denies n/v. She is voiding spontaneously.     Vital Signs Last 24 Hours  T(C): 36.8 (08-16-22 @ 06:26), Max: 37.3 (08-15-22 @ 17:20)  HR: 98 (08-16-22 @ 06:26) (98 - 109)  BP: 151/93 (08-16-22 @ 06:26) (137/75 - 169/89)  RR: 16 (08-16-22 @ 06:26) (16 - 18)  SpO2: 100% (08-16-22 @ 06:26) (100% - 100%)    Physical Exam:  General: NAD  CV: RRR  Lungs: CTAB  Abdomen: Soft, minimal suprapubic tenderness radiating to the right, non-distended, +BS. Palpable fullness in RLQ. No rebound tenderness or guarding.  Ext: No pain or swelling    Labs:                        6.5    9.62  )-----------( 522      ( 15 Aug 2022 13:56 )             24.7   baso 0.0    eos 0.0    imm gran x      lymph 30.2   mono 2.6    poly 66.4       MEDICATIONS  (STANDING):  captopril 12.5 milliGRAM(s) Oral daily  dextrose 5%. 1000 milliLiter(s) (100 mL/Hr) IV Continuous <Continuous>  dextrose 5%. 1000 milliLiter(s) (50 mL/Hr) IV Continuous <Continuous>  dextrose 50% Injectable 25 Gram(s) IV Push once  dextrose 50% Injectable 12.5 Gram(s) IV Push once  dextrose 50% Injectable 25 Gram(s) IV Push once  glucagon  Injectable 1 milliGRAM(s) IntraMuscular once  insulin lispro (ADMELOG) corrective regimen sliding scale   SubCutaneous three times a day before meals    MEDICATIONS  (PRN):  dextrose Oral Gel 15 Gram(s) Oral once PRN Blood Glucose LESS THAN 70 milliGRAM(s)/deciliter

## 2022-08-16 NOTE — ED CDU PROVIDER INITIAL DAY NOTE - TOBACCO USE
Vascular and Interventional Radiology  Post-Procedure Note    Pre-op diagnosis:  Thyroid nodules   Post-op diagnosis:  Same     Surgeon:  Trent Rubalcava MD ;  Shamika Tristan CNP       Anesthesia:  Local anesthesia.   Anesthesiologist:  N/A    Procedure:  US guided thyroid biopsy.    EBL:  <5 cc  Specimen:  Sent to lab    Implants:  none    Complications:  None    Findings:  US access was diagnostic material obtained      Full report to follow.        Shamika Tristan CNP   11/10/2021    Never smoker

## 2022-08-16 NOTE — ED CDU PROVIDER SUBSEQUENT DAY NOTE - PROGRESS NOTE DETAILS
Pt reports feeling better.  No lightheadedness, fatigue, chest pain or SOB at this time.  No pelvic or abdominal pain at this time.  Hgb improved.  OB/GYN recommends no further in hospital intervention or diagnostic studies at this time; they recommend outpatient clinic follow up tomorrow for pre-surgical evaluation.  Pt medically stable for discharge.  Strict return precautions given.  pt to follow up with PMD and OB/GYN.  Reassessment performed and plan for discharge discussed with Dr. Doll who agrees with disposition and discharge plan. Pt reports feeling better.  No lightheadedness, fatigue, chest pain or SOB at this time.  No pelvic or abdominal pain at this time.  Hgb improved.  OB/GYN recommends no further in hospital intervention or diagnostic studies at this time; they recommend outpatient clinic follow up tomorrow for pre-surgical evaluation.  OB/GYN states OB/GYN team will call patient with an appointment for some time tomorrow.  Pt medically stable for discharge.  Strict return precautions given.  pt to follow up with PMD and OB/GYN.  Reassessment performed and plan for discharge discussed with Dr. Doll who agrees with disposition and discharge plan.

## 2022-08-16 NOTE — ED CDU PROVIDER DISPOSITION NOTE - CLINICAL COURSE
Pt is a 38 y/o F PMHx anemia requiring transfusion p/w pelvic pain.  Pt found to have large left ovarian cystic structure.  Pt found to be anemic.  Pt placed in CDU for blood transfusion.  Pt reports resolution of lightheadedness, fatigue after blood transfusion.  OB/GYN recommends no inpatient intervention at this time; they recommend close outpatient clinic follow up for operative planning.  Pt was deemed medically stable for discharge with instructions to follow up at ob/gyn clinic.

## 2022-08-16 NOTE — ED CDU PROVIDER SUBSEQUENT DAY NOTE - HISTORY
38 y/o F with hx of anemia and large right ovarian cyst accepted CDU for blood transfusion. In interim patient is resting comfortably, completed blood transfusion without complications. patient is pending repeat CBC

## 2022-08-16 NOTE — ED CDU PROVIDER DISPOSITION NOTE - PATIENT PORTAL LINK FT
You can access the FollowMyHealth Patient Portal offered by [facility] by registering at the following website: [email or web address]. By joining [facility]’s FollowMyHealth portal, you will also be able to view your health information using other applications (apps) compatible with our system.

## 2022-08-16 NOTE — PROGRESS NOTE ADULT - ASSESSMENT
A/P: 36yo initially presented w/ pelvic pain, found to be anemic, observed in CDU overnight for administration of 1uPRBC and pain control. Patient hemodynamically stable overnight, symptomatically improving s/p Tylenol and Toradol, and 1uPRBC.     Neuro: Tylenol, Motrin PRN  CV: Hemodynamically stable, f/u post-transfusion CBC  Pulm: Saturating well on room air, continue oob/amb  GI: Regular diet as tolerated  : Voiding freely  Dispo: No GYN contraindications to discharge. Continue excellent care by primary team. Patient instructed to f/u in [facility]:  [id number], [place] [address]  [phone]    Torie Nunez PGY3

## 2022-08-16 NOTE — ED CDU PROVIDER INITIAL DAY NOTE - OBJECTIVE STATEMENT
37f presents to ed with abd pain. Located across lower abd but worse on left, constant, has become more severe, started yesterday, sudden in onset initially. No associated fevers, chills, ha, cp, sob, cough, vomiting, diarrhea, constipation, dysuria, hematuria. She reports history of surgery to remove ovary on left side prior though she is unsure what cause was.    CDU note: 38 y/o F with hx of anemia (multiple transfusion) most recently 2 wks ago in [facility], ovarian cyst presenting with c/o lower abdominal pain. In ED labs demonstrate hemoglobin of 6.5. CT A/p demonstrates Large septated right adnexal cyst measuring up to 8.8 cm. patient was evaluated by GYN no acute recommendations advised. patient accepted to CDU for blood transfusion

## 2022-08-17 ENCOUNTER — APPOINTMENT (OUTPATIENT)
Dept: OBGYN | Facility: HOSPITAL | Age: 37
End: 2022-08-17

## 2022-08-17 LAB
CULTURE RESULTS: SIGNIFICANT CHANGE UP
SPECIMEN SOURCE: SIGNIFICANT CHANGE UP

## 2022-08-19 NOTE — ED POST DISCHARGE NOTE - RESULT SUMMARY
UCX: Culture grew 3 or more types of organisms which indicate collection contamination; consider recollection only if clinically indicated.  Pt was not discharged on abx.

## 2022-08-31 ENCOUNTER — APPOINTMENT (OUTPATIENT)
Dept: OBGYN | Facility: HOSPITAL | Age: 37
End: 2022-08-31

## 2022-09-07 ENCOUNTER — RESULT REVIEW (OUTPATIENT)
Age: 37
End: 2022-09-07

## 2022-09-07 ENCOUNTER — APPOINTMENT (OUTPATIENT)
Dept: OBGYN | Facility: HOSPITAL | Age: 37
End: 2022-09-07

## 2022-09-07 ENCOUNTER — OUTPATIENT (OUTPATIENT)
Dept: OUTPATIENT SERVICES | Facility: HOSPITAL | Age: 37
LOS: 1 days | End: 2022-09-07

## 2022-09-07 VITALS
HEART RATE: 97 BPM | TEMPERATURE: 97.9 F | WEIGHT: 173 LBS | HEIGHT: 62 IN | DIASTOLIC BLOOD PRESSURE: 90 MMHG | SYSTOLIC BLOOD PRESSURE: 140 MMHG | BODY MASS INDEX: 31.83 KG/M2

## 2022-09-07 DIAGNOSIS — Z98.890 OTHER SPECIFIED POSTPROCEDURAL STATES: Chronic | ICD-10-CM

## 2022-09-07 DIAGNOSIS — N93.9 ABNORMAL UTERINE AND VAGINAL BLEEDING, UNSPECIFIED: ICD-10-CM

## 2022-09-07 DIAGNOSIS — N94.89 OTHER SPECIFIED CONDITIONS ASSOCIATED WITH FEMALE GENITAL ORGANS AND MENSTRUAL CYCLE: ICD-10-CM

## 2022-09-07 DIAGNOSIS — Z90.721 ACQUIRED ABSENCE OF OVARIES, UNILATERAL: Chronic | ICD-10-CM

## 2022-09-07 LAB
BASOPHILS # BLD AUTO: 0.03 K/UL — SIGNIFICANT CHANGE UP (ref 0–0.2)
BASOPHILS NFR BLD AUTO: 0.4 % — SIGNIFICANT CHANGE UP (ref 0–2)
CEA SERPL-MCNC: 1.4 NG/ML — SIGNIFICANT CHANGE UP (ref 1–3.8)
EOSINOPHIL # BLD AUTO: 0.22 K/UL — SIGNIFICANT CHANGE UP (ref 0–0.5)
EOSINOPHIL NFR BLD AUTO: 2.6 % — SIGNIFICANT CHANGE UP (ref 0–6)
HCT VFR BLD CALC: 35.9 % — SIGNIFICANT CHANGE UP (ref 34.5–45)
HGB BLD-MCNC: 10 G/DL — LOW (ref 11.5–15.5)
IANC: 5.06 K/UL — SIGNIFICANT CHANGE UP (ref 1.8–7.4)
IMM GRANULOCYTES NFR BLD AUTO: 0.1 % — SIGNIFICANT CHANGE UP (ref 0–1.5)
LYMPHOCYTES # BLD AUTO: 2.78 K/UL — SIGNIFICANT CHANGE UP (ref 1–3.3)
LYMPHOCYTES # BLD AUTO: 33.3 % — SIGNIFICANT CHANGE UP (ref 13–44)
MCHC RBC-ENTMCNC: 21.8 PG — LOW (ref 27–34)
MCHC RBC-ENTMCNC: 27.9 GM/DL — LOW (ref 32–36)
MCV RBC AUTO: 78.2 FL — LOW (ref 80–100)
MONOCYTES # BLD AUTO: 0.26 K/UL — SIGNIFICANT CHANGE UP (ref 0–0.9)
MONOCYTES NFR BLD AUTO: 3.1 % — SIGNIFICANT CHANGE UP (ref 2–14)
NEUTROPHILS # BLD AUTO: 5.06 K/UL — SIGNIFICANT CHANGE UP (ref 1.8–7.4)
NEUTROPHILS NFR BLD AUTO: 60.5 % — SIGNIFICANT CHANGE UP (ref 43–77)
NRBC # BLD: 0 /100 WBCS — SIGNIFICANT CHANGE UP (ref 0–0)
NRBC # FLD: 0 K/UL — SIGNIFICANT CHANGE UP (ref 0–0)
PLATELET # BLD AUTO: 436 K/UL — HIGH (ref 150–400)
RBC # BLD: 4.59 M/UL — SIGNIFICANT CHANGE UP (ref 3.8–5.2)
RBC # FLD: 17.3 % — HIGH (ref 10.3–14.5)
WBC # BLD: 8.36 K/UL — SIGNIFICANT CHANGE UP (ref 3.8–10.5)
WBC # FLD AUTO: 8.36 K/UL — SIGNIFICANT CHANGE UP (ref 3.8–10.5)

## 2022-09-07 PROCEDURE — 99213 OFFICE O/P EST LOW 20 MIN: CPT | Mod: GC

## 2022-09-07 PROCEDURE — 88305 TISSUE EXAM BY PATHOLOGIST: CPT | Mod: 26

## 2022-09-08 ENCOUNTER — NON-APPOINTMENT (OUTPATIENT)
Age: 37
End: 2022-09-08

## 2022-09-08 LAB
CANCER AG125 SERPL-ACNC: 544 U/ML — HIGH
CANCER AG19-9 SERPL-ACNC: 49 U/ML — HIGH
HPV HIGH+LOW RISK DNA PNL CVX: SIGNIFICANT CHANGE UP

## 2022-09-09 DIAGNOSIS — N94.89 OTHER SPECIFIED CONDITIONS ASSOCIATED WITH FEMALE GENITAL ORGANS AND MENSTRUAL CYCLE: ICD-10-CM

## 2022-09-09 DIAGNOSIS — N93.9 ABNORMAL UTERINE AND VAGINAL BLEEDING, UNSPECIFIED: ICD-10-CM

## 2022-09-10 LAB — CYTOLOGY SPEC DOC CYTO: SIGNIFICANT CHANGE UP

## 2022-09-13 ENCOUNTER — NON-APPOINTMENT (OUTPATIENT)
Age: 37
End: 2022-09-13

## 2022-09-13 ENCOUNTER — OUTPATIENT (OUTPATIENT)
Dept: OUTPATIENT SERVICES | Facility: HOSPITAL | Age: 37
LOS: 1 days | End: 2022-09-13

## 2022-09-13 ENCOUNTER — LABORATORY RESULT (OUTPATIENT)
Age: 37
End: 2022-09-13

## 2022-09-13 ENCOUNTER — APPOINTMENT (OUTPATIENT)
Dept: INTERNAL MEDICINE | Facility: CLINIC | Age: 37
End: 2022-09-13

## 2022-09-13 VITALS
WEIGHT: 170 LBS | DIASTOLIC BLOOD PRESSURE: 90 MMHG | HEART RATE: 100 BPM | OXYGEN SATURATION: 98 % | BODY MASS INDEX: 32.1 KG/M2 | HEIGHT: 61 IN | SYSTOLIC BLOOD PRESSURE: 120 MMHG

## 2022-09-13 DIAGNOSIS — Z78.9 OTHER SPECIFIED HEALTH STATUS: ICD-10-CM

## 2022-09-13 DIAGNOSIS — Z23 ENCOUNTER FOR IMMUNIZATION: ICD-10-CM

## 2022-09-13 DIAGNOSIS — Z90.721 ACQUIRED ABSENCE OF OVARIES, UNILATERAL: Chronic | ICD-10-CM

## 2022-09-13 DIAGNOSIS — Z00.00 ENCOUNTER FOR GENERAL ADULT MEDICAL EXAMINATION W/OUT ABNORMAL FINDINGS: ICD-10-CM

## 2022-09-13 DIAGNOSIS — Z83.3 FAMILY HISTORY OF DIABETES MELLITUS: ICD-10-CM

## 2022-09-13 DIAGNOSIS — K59.00 CONSTIPATION, UNSPECIFIED: ICD-10-CM

## 2022-09-13 DIAGNOSIS — Z01.818 ENCOUNTER FOR OTHER PREPROCEDURAL EXAMINATION: ICD-10-CM

## 2022-09-13 DIAGNOSIS — Z82.49 FAMILY HISTORY OF ISCHEMIC HEART DISEASE AND OTHER DISEASES OF THE CIRCULATORY SYSTEM: ICD-10-CM

## 2022-09-13 DIAGNOSIS — D64.9 ANEMIA, UNSPECIFIED: ICD-10-CM

## 2022-09-13 DIAGNOSIS — Z98.890 OTHER SPECIFIED POSTPROCEDURAL STATES: Chronic | ICD-10-CM

## 2022-09-13 DIAGNOSIS — E11.9 TYPE 2 DIABETES MELLITUS WITHOUT COMPLICATIONS: ICD-10-CM

## 2022-09-13 LAB — SURGICAL PATHOLOGY STUDY: SIGNIFICANT CHANGE UP

## 2022-09-13 PROCEDURE — ZZZZZ: CPT

## 2022-09-13 NOTE — REVIEW OF SYSTEMS
[Fatigue] : fatigue [Dyspnea on Exertion] : dyspnea on exertion [Constipation] : constipation [Negative] : Psychiatric

## 2022-09-14 ENCOUNTER — NON-APPOINTMENT (OUTPATIENT)
Age: 37
End: 2022-09-14

## 2022-09-14 ENCOUNTER — MED ADMIN CHARGE (OUTPATIENT)
Age: 37
End: 2022-09-14

## 2022-09-14 LAB
25(OH)D3 SERPL-MCNC: 20.8 NG/ML
ALBUMIN SERPL ELPH-MCNC: 4.9 G/DL
ALP BLD-CCNC: 70 U/L
ALT SERPL-CCNC: 17 U/L
ANION GAP SERPL CALC-SCNC: 15 MMOL/L
AST SERPL-CCNC: 17 U/L
BASOPHILS # BLD AUTO: 0.06 K/UL
BASOPHILS NFR BLD AUTO: 0.6 %
BILIRUB SERPL-MCNC: 0.3 MG/DL
BUN SERPL-MCNC: 11 MG/DL
CALCIUM SERPL-MCNC: 9.6 MG/DL
CHLORIDE SERPL-SCNC: 104 MMOL/L
CHOLEST SERPL-MCNC: 178 MG/DL
CO2 SERPL-SCNC: 21 MMOL/L
CREAT SERPL-MCNC: 0.61 MG/DL
EGFR: 117 ML/MIN/1.73M2
EOSINOPHIL # BLD AUTO: 0.24 K/UL
EOSINOPHIL NFR BLD AUTO: 2.4 %
ESTIMATED AVERAGE GLUCOSE: 108 MG/DL
FERRITIN SERPL-MCNC: 39 NG/ML
FOLATE SERPL-MCNC: 13.9 NG/ML
GLUCOSE SERPL-MCNC: 139 MG/DL
HBA1C MFR BLD HPLC: 5.4 %
HCT VFR BLD CALC: 38.2 %
HDLC SERPL-MCNC: 36 MG/DL
HGB BLD-MCNC: 10.3 G/DL
IMM GRANULOCYTES NFR BLD AUTO: 0.1 %
IRON SATN MFR SERPL: 5 %
IRON SERPL-MCNC: 23 UG/DL
LDLC SERPL CALC-MCNC: 118 MG/DL
LYMPHOCYTES # BLD AUTO: 3.56 K/UL
LYMPHOCYTES NFR BLD AUTO: 35.5 %
MAN DIFF?: NORMAL
MCHC RBC-ENTMCNC: 21.4 PG
MCHC RBC-ENTMCNC: 27 GM/DL
MCV RBC AUTO: 79.4 FL
MONOCYTES # BLD AUTO: 0.39 K/UL
MONOCYTES NFR BLD AUTO: 3.9 %
NEUTROPHILS # BLD AUTO: 5.76 K/UL
NEUTROPHILS NFR BLD AUTO: 57.5 %
NONHDLC SERPL-MCNC: 142 MG/DL
PLATELET # BLD AUTO: 357 K/UL
POTASSIUM SERPL-SCNC: 4.6 MMOL/L
PROT SERPL-MCNC: 7.6 G/DL
RBC # BLD: 4.81 M/UL
RBC # BLD: 4.81 M/UL
RBC # FLD: 17 %
RETICS # AUTO: 1.7 %
RETICS AGGREG/RBC NFR: 79.8 K/UL
SODIUM SERPL-SCNC: 140 MMOL/L
TIBC SERPL-MCNC: 500 UG/DL
TRANSFERRIN SERPL-MCNC: 385 MG/DL
TRIGL SERPL-MCNC: 122 MG/DL
UIBC SERPL-MCNC: 477 UG/DL
VIT B12 SERPL-MCNC: 474 PG/ML
WBC # FLD AUTO: 10.02 K/UL

## 2022-09-17 ENCOUNTER — NON-APPOINTMENT (OUTPATIENT)
Age: 37
End: 2022-09-17

## 2022-09-17 LAB
HCG UR QL: NEGATIVE
QUALITY CONTROL: YES

## 2022-09-17 RX ORDER — METFORMIN HYDROCHLORIDE 500 MG/1
500 TABLET, COATED ORAL
Refills: 0 | Status: DISCONTINUED | COMMUNITY
Start: 2022-09-13 | End: 2022-09-17

## 2022-09-20 NOTE — PROCEDURE
[Cervical Pap Smear] : cervical Pap smear [Liquid Base] : liquid base [Endometrial Biopsy] : Endometrial biopsy [Irregular Bleeding] : irregular uterine bleeding [Risks] : risks [Benefits] : benefits [Alternatives] : alternatives [Patient] : patient [Guardian] : guardian [Infection] : infection [Bleeding] : bleeding [Allergic Reaction] : allergic reaction [Uterine Perforation] : uterine perforation [No Premedication] : No premedication [None] : none [Easy Passage] : Easy passage [Sounded to ___ cm] : sounded to [unfilled] ~Ucm [Mid Position] : mid position [Scant] : scant [Sent to Pathology] : placed in buffered formalin and sent for pathology [Tolerated Well] : Patient tolerated the procedure well [No Complications] : No complications [LMPDate] : 8/5/2022

## 2022-09-20 NOTE — PHYSICAL EXAM
[Appropriately responsive] : appropriately responsive [Oriented x3] : oriented x3 [Chaperone Present] : A chaperone was present in the examining room during all aspects of the physical examination [Soft] : soft [Non-tender] : non-tender [Non-distended] : non-distended [Labia Majora] : normal [Labia Minora] : normal [Normal] : normal [Adnexa Tenderness On The Right] : tender  [___] : [unfilled] ~Ucm mass on the right [Uterine Adnexae] : normal  [FreeTextEntry1] : Exam performed by myself and Dr. [facility] [Tenderness] : nontender [FreeTextEntry4] : limited apical descent [FreeTextEntry8] : mobile uterus, slightly limited by body habitus

## 2022-09-20 NOTE — END OF VISIT
[] : Resident [FreeTextEntry3] : 38 yo P1 w/s/sx of adeno and rt adnexal mass.\par Pt seen and examined by me.\par Agree w/assessment and plan\par Schedule TLH RO cystectomy

## 2022-09-20 NOTE — HISTORY OF PRESENT ILLNESS
[FreeTextEntry1] : 38yo , LMP , presenting for a new consultation 2/2 abnormal uterine bleeding and a large right adnexal mass found on CT in the [facility] ED. Patient initially presented to [facility] ED on 8/15 with lower abdominal tenderness and vaginal bleeding. GYN consulted to rule out torsion. Patient H/H at the time was 6.5/24.7, otherwise asymptomatic. Pt admitted to CDU, given 1u PRBC with appropriate rise to 7.9/29.4. She was discharged to follow-up outpatient for surgical scheduling. Since then she reports daily vaginal bleeding, using 1-2 pads/day. Additionally, she states she has intermittent abdominal pain. She denies weakness, fatigue, dizziness, lightheadedness, fevers/chills, nausea/vomiting, weight loss, decreased appetite, changes in urinary or bowel habits.\par \par OB:  x1, TOP x3 s/p D&C x3\par GYN: as above; s/p [facility] [place] 2019 (presented to ED with concern for torsion); denies abnormal pap smears, STIs;\par PMH: HTN, T2DM\par PSH: C LSO 2019, D&C x3\par Social: lives in [place] [facility], visiting until 10/5\par Meds: Metformin, Captopril\par All: NKDA\par \par CTAP (8/15): Enlarged, globular uterus with tiny low-attenuation  foci consistent with known adenomyosis. Large septated right adnexal cyst  measuring up to 8.8 cm. This is inseparable from the base of the cecum. Enlarged right lower quadrant mesenteric lymph nodes. Differential diagnosis includes mesenteric adenitis line. Nonobstructive left renal calculus\par \par TVUS (8/15): Uterus: 12.7 cm x 8.8 cm x 8.7 cm. Enlarged heterogeneous uterus \par compatible with adenomyosis. Endometrium: 7 mm. Indistinct endometrial myometrial junction\par Right ovary: Seen transabdominally only. Mildly complex 9.5 x 6.7 x 10.2  cm cyst contains mural nodularity and thin internal septations Left ovary: Left oophorectomy Fluid: None.\par \par \par \par \par

## 2022-09-21 ENCOUNTER — NON-APPOINTMENT (OUTPATIENT)
Age: 37
End: 2022-09-21

## 2022-09-21 ENCOUNTER — APPOINTMENT (OUTPATIENT)
Dept: CARDIOLOGY | Facility: HOSPITAL | Age: 37
End: 2022-09-21

## 2022-09-21 VITALS
WEIGHT: 170 LBS | DIASTOLIC BLOOD PRESSURE: 84 MMHG | HEART RATE: 87 BPM | OXYGEN SATURATION: 100 % | SYSTOLIC BLOOD PRESSURE: 131 MMHG | BODY MASS INDEX: 32.12 KG/M2 | TEMPERATURE: 98.6 F | RESPIRATION RATE: 16 BRPM

## 2022-09-21 NOTE — END OF VISIT
[FreeTextEntry3] : Merrick Rocha has hx of HTN, DM2, R adnexal mass, AUB now presenting with exertional chest pain and abnormal ECG, with non-specific T wave inversions. Chest pain may be a manifestation of GERD. Current ECG noted LVH with non-specific T wave abnormalities, suggesting hypertensive heart disease in setting of uncontrolled hypertension. TTE was ordered for further assessment.  Discontinue captopril and substitute lisinopril, since long acting medication (once daily dosing) will improve overall BP and make compliance easier. Given HTN and DM-II, ill obtain urine albumin/creat ratio. Patient has exercise capacity METS>4 and is able to climb several set of stairs. CP is non-cardiac in nature. RCRI 0 points. Therefore no additional assessments will be recommended for the purpose of pre-operative assessment.

## 2022-09-21 NOTE — ASSESSMENT
[FreeTextEntry1] : Patient is a 37 year old F with hx of HTN, DM2, R adnexal mass, AUB coming in for cardiology evaluation due to chest pain and non-specific T wave inversions on EKG. \par \par #Chest pain\par Most likely in the setting of GERD. Does not appear to be cardiac in nature. EKG shows LVH with non-specific T wave abnormalities which is consistnet with her uncontrolled hypertension. \par -TTE re-ordered\par -Better BP control, will switch captopril to lisinopril (sent to the pharmacy\par \par #Hypertension\par -Stop captopril and start patient on lisinopril as it is a once a day medication \par -Will obtain Urine creat/protein ratio\par \par #Pre-op\par Patient has exercise capacity METS>4 and is able to climb several set of stairs. HEr chest pain is non-cardiac in nature. EKG noted. She has 0 RCRI risk factors\par -PAtient can proceed to surgery

## 2022-09-21 NOTE — HISTORY OF PRESENT ILLNESS
[FreeTextEntry1] : Patient is a 37 year old F with hx of HTN, DM2, R adnexal mass, AUB coming in for cardiology evaluation due to chest pain and non-specific T wave inversions on EKG. \par \par Patient endorses occasional CP, pressure like. Has happened a couple of times. Lasts for 5 mins and it only occurred after having a big meal or when she ate and then layed down. The discomfort happened at rest and not while exerting herself. She is able to walk several blocks without any chest pain or shortness of breath. She can climb several set of stairs without symtpms. She denies orthopnea, PND, LE edema. \par \par The patient saw IM for pre-op who recommended to obtain an ECHO which has not yet been done. \par \par HEr blood pressure is measured in the 150's systolic at home but more recently was better controlled. \par \par Patient is scheduled for surgery on 9/26/2022.\par \par Social history: Shift worker (night time\par \par \par

## 2022-09-23 ENCOUNTER — NON-APPOINTMENT (OUTPATIENT)
Age: 37
End: 2022-09-23

## 2022-09-23 ENCOUNTER — OUTPATIENT (OUTPATIENT)
Dept: OUTPATIENT SERVICES | Facility: HOSPITAL | Age: 37
LOS: 1 days | End: 2022-09-23

## 2022-09-23 VITALS
RESPIRATION RATE: 15 BRPM | HEART RATE: 84 BPM | SYSTOLIC BLOOD PRESSURE: 148 MMHG | WEIGHT: 169.98 LBS | HEIGHT: 62 IN | DIASTOLIC BLOOD PRESSURE: 88 MMHG | OXYGEN SATURATION: 100 % | TEMPERATURE: 98 F

## 2022-09-23 DIAGNOSIS — E11.9 TYPE 2 DIABETES MELLITUS WITHOUT COMPLICATIONS: ICD-10-CM

## 2022-09-23 DIAGNOSIS — N93.9 ABNORMAL UTERINE AND VAGINAL BLEEDING, UNSPECIFIED: ICD-10-CM

## 2022-09-23 DIAGNOSIS — Z87.898 PERSONAL HISTORY OF OTHER SPECIFIED CONDITIONS: ICD-10-CM

## 2022-09-23 DIAGNOSIS — I10 ESSENTIAL (PRIMARY) HYPERTENSION: ICD-10-CM

## 2022-09-23 DIAGNOSIS — Z98.890 OTHER SPECIFIED POSTPROCEDURAL STATES: Chronic | ICD-10-CM

## 2022-09-23 DIAGNOSIS — Z90.721 ACQUIRED ABSENCE OF OVARIES, UNILATERAL: Chronic | ICD-10-CM

## 2022-09-23 LAB
A1C WITH ESTIMATED AVERAGE GLUCOSE RESULT: 5.3 % — SIGNIFICANT CHANGE UP (ref 4–5.6)
ALBUMIN SERPL ELPH-MCNC: 4.7 G/DL — SIGNIFICANT CHANGE UP (ref 3.3–5)
ALP SERPL-CCNC: 73 U/L — SIGNIFICANT CHANGE UP (ref 40–120)
ALT FLD-CCNC: 21 U/L — SIGNIFICANT CHANGE UP (ref 4–33)
ANION GAP SERPL CALC-SCNC: 13 MMOL/L — SIGNIFICANT CHANGE UP (ref 7–14)
APPEARANCE UR: CLEAR — SIGNIFICANT CHANGE UP
AST SERPL-CCNC: 15 U/L — SIGNIFICANT CHANGE UP (ref 4–32)
BILIRUB SERPL-MCNC: 0.3 MG/DL — SIGNIFICANT CHANGE UP (ref 0.2–1.2)
BILIRUB UR-MCNC: NEGATIVE — SIGNIFICANT CHANGE UP
BUN SERPL-MCNC: 9 MG/DL — SIGNIFICANT CHANGE UP (ref 7–23)
CALCIUM SERPL-MCNC: 10 MG/DL — SIGNIFICANT CHANGE UP (ref 8.4–10.5)
CHLORIDE SERPL-SCNC: 102 MMOL/L — SIGNIFICANT CHANGE UP (ref 98–107)
CO2 SERPL-SCNC: 23 MMOL/L — SIGNIFICANT CHANGE UP (ref 22–31)
COLOR SPEC: SIGNIFICANT CHANGE UP
CREAT SERPL-MCNC: 0.6 MG/DL — SIGNIFICANT CHANGE UP (ref 0.5–1.3)
DIFF PNL FLD: NEGATIVE — SIGNIFICANT CHANGE UP
EGFR: 117 ML/MIN/1.73M2 — SIGNIFICANT CHANGE UP
ESTIMATED AVERAGE GLUCOSE: 105 — SIGNIFICANT CHANGE UP
GLUCOSE SERPL-MCNC: 119 MG/DL — HIGH (ref 70–99)
GLUCOSE UR QL: NEGATIVE — SIGNIFICANT CHANGE UP
HCG UR QL: NEGATIVE — SIGNIFICANT CHANGE UP
HCT VFR BLD CALC: 38.9 % — SIGNIFICANT CHANGE UP (ref 34.5–45)
HGB BLD-MCNC: 10.8 G/DL — LOW (ref 11.5–15.5)
KETONES UR-MCNC: NEGATIVE — SIGNIFICANT CHANGE UP
LEUKOCYTE ESTERASE UR-ACNC: ABNORMAL
MCHC RBC-ENTMCNC: 21.1 PG — LOW (ref 27–34)
MCHC RBC-ENTMCNC: 27.8 GM/DL — LOW (ref 32–36)
MCV RBC AUTO: 76 FL — LOW (ref 80–100)
NITRITE UR-MCNC: NEGATIVE — SIGNIFICANT CHANGE UP
NRBC # BLD: 0 /100 WBCS — SIGNIFICANT CHANGE UP (ref 0–0)
NRBC # FLD: 0 K/UL — SIGNIFICANT CHANGE UP (ref 0–0)
PH UR: 6.5 — SIGNIFICANT CHANGE UP (ref 5–8)
PLATELET # BLD AUTO: 339 K/UL — SIGNIFICANT CHANGE UP (ref 150–400)
POTASSIUM SERPL-MCNC: 4.1 MMOL/L — SIGNIFICANT CHANGE UP (ref 3.5–5.3)
POTASSIUM SERPL-SCNC: 4.1 MMOL/L — SIGNIFICANT CHANGE UP (ref 3.5–5.3)
PROT SERPL-MCNC: 7.9 G/DL — SIGNIFICANT CHANGE UP (ref 6–8.3)
PROT UR-MCNC: NEGATIVE — SIGNIFICANT CHANGE UP
RBC # BLD: 5.12 M/UL — SIGNIFICANT CHANGE UP (ref 3.8–5.2)
RBC # FLD: 16.2 % — HIGH (ref 10.3–14.5)
SODIUM SERPL-SCNC: 138 MMOL/L — SIGNIFICANT CHANGE UP (ref 135–145)
SP GR SPEC: 1.01 — SIGNIFICANT CHANGE UP (ref 1.01–1.05)
UROBILINOGEN FLD QL: SIGNIFICANT CHANGE UP
WBC # BLD: 10.21 K/UL — SIGNIFICANT CHANGE UP (ref 3.8–10.5)
WBC # FLD AUTO: 10.21 K/UL — SIGNIFICANT CHANGE UP (ref 3.8–10.5)

## 2022-09-23 RX ORDER — METFORMIN HYDROCHLORIDE 850 MG/1
1 TABLET ORAL
Qty: 0 | Refills: 0 | DISCHARGE

## 2022-09-23 RX ORDER — LISINOPRIL 2.5 MG/1
1 TABLET ORAL
Qty: 0 | Refills: 0 | DISCHARGE

## 2022-09-23 NOTE — HISTORY OF PRESENT ILLNESS
[No Pertinent Cardiac History] : no history of aortic stenosis, atrial fibrillation, coronary artery disease, recent myocardial infarction, or implantable device/pacemaker [No Pertinent Pulmonary History] : no history of asthma, COPD, sleep apnea, or smoking [No Adverse Anesthesia Reaction] : no adverse anesthesia reaction in self or family member [Diabetes] : diabetes [Good (7-10 METs)] : Good (7-10 METs) [NSAIDs: _____] : NSAIDs: [unfilled] [Chronic Anticoagulation] : no chronic anticoagulation [Chronic Kidney Disease] : no chronic kidney disease [FreeTextEntry1] : TLH, RS, Right Cystectomy vs. Oophorectomy, Cystoscopy, possible Laparotomy  [FreeTextEntry2] : 9/26/22 [FreeTextEntry4] : \par Patient is a 37 year old F with hx of HTN, DM2, R adnexal mass, AUB coming in for pre-op evaluation. \par \par In terms of her diabetes reports that she is only on metformin- FS range from 160-250. Tests it in the morning. Followed a doctor in [facility] for this. In terms of her BP reports it is around 150/90. \par \par Patient endorses occasional CP, pressure like. Has happened a couple of times. Lasts for 5 mins. Feels it at rest. Feels it when she is told her "blood is low". Pt also endorsing SOB on exertion and palpitations that happens occasionally. Denies any leg swelling, claudication or syncope. \par

## 2022-09-23 NOTE — H&P PST ADULT - HISTORY OF PRESENT ILLNESS
36 y/o female with hx of abnormal uterine bleeding and large adnexal mass found on CT at [facility] ED. Pt presented to [facility] ED with lower abdominal pain and vaginal bleeding 8/15/22, and received 1 unit PRBC's.  Pt now scheduled for Laparoscopic total hysterectomy, right salpingectomy, possible right ovarian cystectomy, possible oophorectomy, cystoscopy.

## 2022-09-23 NOTE — H&P PST ADULT - CARDIOVASCULAR COMMENTS
Pt reports she had episode of CP Jan 2022.  Also with abnormal EKG. Cardiology eval was done; Dx with HTN, "CP non cardiac in nature Dx with possible GERD. Pt reports she had episode of CP on exertion Jan 2022.  Also with abnormal EKG. Cardiology eval was done; Dx with HTN, "CP non cardiac in nature Dx with possible GERD.

## 2022-09-23 NOTE — PLAN
[FreeTextEntry1] : \par \par \par Patient is a 37 year old F with hx of HTN, DM2, R adnexal mass, AUB coming in for pre-op evaluation. \par \par \par #pre-op eval\par - EKG noted to have non specific T wave abnormalities \par - Pt also endorsing CP, YEE and palpitations, although could be in the setting of anemia cannot r/o underlying cardiac condition\par - will recommend cardiology consultation and TTE\par - see below for addendum\par \par #HTN\par - will obtain electrolyte profile\par -advised pt to clarify medication doses\par - cont  BP monitoring at home\par \par #anemia\par - will obtain CBC and iron studies\par - reports taking iron supplement for 2 wks \par \par #DM2\par - A1c today, lipid profile\par -cont FS monitoring at home\par \par #adnexal mass\par - management as per GYN\par \par #HCM\par -labs as above\par - flu shot ordered\par \par f/u in 2 wks

## 2022-09-23 NOTE — H&P PST ADULT - PROBLEM SELECTOR PLAN 2
and abnormal EKG.   Pt was seen by Cardiologist, evaluation on chart.  Per Cardiologist "CP non cardiac in nature.  Dx with uncontrolled HTN.  Pt on Lisinopril and abnormal EKG.   Pt was seen by Cardiologist, evaluation on chart.  Per Cardiologist "CP non cardiac in nature".  Dx with uncontrolled HTN.  Pt on Lisinopril   Advised to take her Lisinopril morning of surgery with sip of water

## 2022-09-23 NOTE — H&P PST ADULT - URINARY CATHETER
Left  to call back at her convenience to schedule fasting lab and 3 week f/u with Anastasia CRUZ    no

## 2022-09-23 NOTE — H&P PST ADULT - NSICDXPASTMEDICALHX_GEN_ALL_CORE_FT
PAST MEDICAL HISTORY:  Chronic hypertension     DM (diabetes mellitus)     Obesity     Ovarian cyst      PAST MEDICAL HISTORY:  Anemia     Chronic hypertension     DM (diabetes mellitus)     Obesity     Ovarian cyst

## 2022-09-23 NOTE — ADDENDUM
[FreeTextEntry1] : \par Patient has been evaluated by cardiology and it was recommended that she can proceed to surgery\par

## 2022-09-23 NOTE — H&P PST ADULT - GENITOURINARY COMMENTS
hx of abnormal uterine bleeding and large adnexal mass found on CT at [facility] ED. Pt presented to [facility] ED with lower abdominal pain and vaginal bleeding 8/15/22, and received 1 unit PRBC's.  Pt now scheduled for Laparoscopic total hysterectomy, right salpingectomy, possible right ovarian cystectomy, possible oophorectomy, cystoscopy. not examined

## 2022-09-23 NOTE — H&P PST ADULT - ASSESSMENT
[id number] 38 y/o female with hx of abnormal uterine bleeding and large adnexal mass found on CT at [facility] ED. Pt presented to [facility] ED with lower abdominal pain and vaginal bleeding 8/15/22, and received 1 unit PRBC's.  Pt now scheduled for Laparoscopic total hysterectomy, right salpingectomy, possible right ovarian cystectomy, possible oophorectomy, cystoscopy.

## 2022-09-23 NOTE — H&P PST ADULT - PROBLEM SELECTOR PLAN 1
Laparoscopic total hysterectomy, right salpingectomy, possible right ovarian cystectomy, possible oophorectomy, cystoscopy.     CBC CMP HgbA1C T&S UA CS UCG    Preop covid testing protocol reviewed with pt

## 2022-09-24 LAB
CULTURE RESULTS: SIGNIFICANT CHANGE UP
SPECIMEN SOURCE: SIGNIFICANT CHANGE UP

## 2022-09-27 ENCOUNTER — NON-APPOINTMENT (OUTPATIENT)
Age: 37
End: 2022-09-27

## 2022-09-27 ENCOUNTER — APPOINTMENT (OUTPATIENT)
Dept: OBGYN | Facility: HOSPITAL | Age: 37
End: 2022-09-27

## 2022-09-27 PROBLEM — N83.209 UNSPECIFIED OVARIAN CYST, UNSPECIFIED SIDE: Chronic | Status: ACTIVE | Noted: 2022-09-23

## 2022-09-27 PROBLEM — E66.9 OBESITY, UNSPECIFIED: Chronic | Status: ACTIVE | Noted: 2022-09-23

## 2022-09-27 PROBLEM — D64.9 ANEMIA, UNSPECIFIED: Chronic | Status: ACTIVE | Noted: 2022-09-23

## 2022-09-28 ENCOUNTER — OUTPATIENT (OUTPATIENT)
Dept: OUTPATIENT SERVICES | Facility: HOSPITAL | Age: 37
LOS: 1 days | End: 2022-09-28

## 2022-09-28 ENCOUNTER — APPOINTMENT (OUTPATIENT)
Dept: GYNECOLOGIC ONCOLOGY | Facility: HOSPITAL | Age: 37
End: 2022-09-28

## 2022-09-28 VITALS
DIASTOLIC BLOOD PRESSURE: 84 MMHG | HEART RATE: 90 BPM | SYSTOLIC BLOOD PRESSURE: 145 MMHG | WEIGHT: 170 LBS | HEIGHT: 61 IN | BODY MASS INDEX: 32.1 KG/M2 | TEMPERATURE: 98 F

## 2022-09-28 DIAGNOSIS — Z90.721 ACQUIRED ABSENCE OF OVARIES, UNILATERAL: Chronic | ICD-10-CM

## 2022-09-28 DIAGNOSIS — Z98.890 OTHER SPECIFIED POSTPROCEDURAL STATES: Chronic | ICD-10-CM

## 2022-09-28 PROCEDURE — 99204 OFFICE O/P NEW MOD 45 MIN: CPT | Mod: 57,GC

## 2022-09-28 RX ORDER — CAPTOPRIL 25 MG/1
25 TABLET ORAL DAILY
Qty: 30 | Refills: 1 | Status: DISCONTINUED | COMMUNITY
Start: 2022-09-17 | End: 2022-09-28

## 2022-09-28 NOTE — HISTORY OF PRESENT ILLNESS
[FreeTextEntry1] : 38yo  LMP  referred to GynOnc from Gyn for evaluation of adnexal mass and elevated tumor markers. \par \par Patient initially presented to [facility] ED on 8/15 with lower abdominal tenderness and vaginal bleeding. GYN consulted to rule out torsion. Patient H/H at the time was 6.5/24.7, otherwise asymptomatic. Pt admitted to CDU, given 1u pRBC with appropriate rise to 7.9/29.4. She was discharged to follow-up outpatient for surgical scheduling. \par \par Patient followed up with Gyn on 22 for workup of AUB and right adnexal mass seen on CT in [facility] ED (8/15/22). She was scheduled for surgery after tumor markers and medical clearance. Patient received medical clearance. Her tumor markers were notable for elevation in  and CA 19-9 (see below). Of note, pt with elevated  after LSO in 2019 (at [facility]) though it was never followed. \par \par Today, patient without concerns. She notes LLQ abdominal pain that is intermittent in nature and abdominal bloating. She does note intermenstrual bleeding, though none currently. Notes urinary frequency. Denies fatigue, early satiety, unintentional weight loss, constipation or hematochezia.\par \par \par \par OB:  x1, TOP x3 s/p D&C x3\par GYN: as above; [place] [facility] [place] (presented to ED with concern for torsion); denies abnormal pap smears, STIs;\par PMH: HTN, T2DM (Cards = Carmen)\par PSH: [facility] [place] [place], D&C x3\par Social: lives in [place], though has support here \par Meds: Metformin, Lisinopril \par All: NKDA\par \par \par \par CTAP (8/15): Enlarged, globular uterus with tiny low-attenuation foci consistent with known adenomyosis. Large septated right adnexal cyst measuring up to 8.8 cm. This is inseparable from the base of the cecum. Enlarged right lower quadrant mesenteric lymph nodes. Differential diagnosis includes mesenteric adenitis line. Nonobstructive left renal calculus\par \par TVUS (8/15): Uterus: 12.7 cm x 8.8 cm x 8.7 cm. Enlarged heterogeneous uterus \par compatible with adenomyosis. Endometrium: 7 mm. Indistinct endometrial myometrial junction\par Right ovary: Seen transabdominally only. Mildly complex 9.5 x 6.7 x 10.2 cm cyst contains mural nodularity and thin internal septations Left ovary: Left oophorectomy Fluid: None.

## 2022-09-28 NOTE — ASSESSMENT
[FreeTextEntry1] : 38yo  LMP  referred to GynOnc from Gyn for evaluation of right adnexal mass and elevated tumor markers c/f ovarian malignancy vs benign ovarian cyst.\par \par - Counseled patient on elevated tumor markers and concern for malignancy, though suspicion low. Discussed management including surgical management. Pt opts for surgical management.\par - Counseled patient on exploratory laparotomy, total hysterectomy, right salpingo-oophorectomy with possible staging. Discussed r/b/a including infection, bleeding and damage to surrounding structures. Pt accepts blood. Patient aware of the problems, proposed management and alternatives. She wants to proceed with surgery. She understands that she may require post-op chemotherapy or RT or both..\par - Obtained cardiac clearance \par - Will need colonoscopy prior to procedure. Referral given. \par - Consent forms signed and booking form submitted\par \par Elena Roach PGY 2\par seen and examined with Fellow Dr Love and Dr Goldberg

## 2022-09-28 NOTE — PHYSICAL EXAM
[Chaperone Present] : A chaperone was present in the examining room during all aspects of the physical examination [Normal] : Recto-Vaginal Exam: Normal [Abnormal] : Adnexa(ae): Abnormal [de-identified] : abdomino-pelvic mass inseparable from adnexae [de-identified] : Large, nontender, solid and immobile pelvvic mass filling cul-de-sac [Fully active, able to carry on all pre-disease performance without restriction] : Status 0 - Fully active, able to carry on all pre-disease performance without restriction

## 2022-09-29 DIAGNOSIS — R97.8 OTHER ABNORMAL TUMOR MARKERS: ICD-10-CM

## 2022-09-29 DIAGNOSIS — R19.00 INTRA-ABDOMINAL AND PELVIC SWELLING, MASS AND LUMP, UNSPECIFIED SITE: ICD-10-CM

## 2022-09-29 DIAGNOSIS — D21.9 BENIGN NEOPLASM OF CONNECTIVE AND OTHER SOFT TISSUE, UNSPECIFIED: ICD-10-CM

## 2022-10-11 ENCOUNTER — NON-APPOINTMENT (OUTPATIENT)
Age: 37
End: 2022-10-11

## 2022-10-13 ENCOUNTER — NON-APPOINTMENT (OUTPATIENT)
Age: 37
End: 2022-10-13

## 2022-10-13 ENCOUNTER — TRANSCRIPTION ENCOUNTER (OUTPATIENT)
Age: 37
End: 2022-10-13

## 2022-10-14 ENCOUNTER — APPOINTMENT (OUTPATIENT)
Dept: GYNECOLOGIC ONCOLOGY | Facility: HOSPITAL | Age: 37
End: 2022-10-14

## 2022-10-14 ENCOUNTER — TRANSCRIPTION ENCOUNTER (OUTPATIENT)
Age: 37
End: 2022-10-14

## 2022-10-14 ENCOUNTER — RESULT REVIEW (OUTPATIENT)
Age: 37
End: 2022-10-14

## 2022-10-14 ENCOUNTER — INPATIENT (INPATIENT)
Facility: HOSPITAL | Age: 37
LOS: 2 days | Discharge: HOME | End: 2022-10-17
Attending: OBSTETRICS & GYNECOLOGY | Admitting: OBSTETRICS & GYNECOLOGY
Payer: SUBSIDIZED

## 2022-10-14 VITALS
RESPIRATION RATE: 16 BRPM | TEMPERATURE: 99 F | SYSTOLIC BLOOD PRESSURE: 154 MMHG | WEIGHT: 169.98 LBS | HEART RATE: 100 BPM | HEIGHT: 62 IN | DIASTOLIC BLOOD PRESSURE: 87 MMHG | OXYGEN SATURATION: 99 %

## 2022-10-14 DIAGNOSIS — Z98.890 OTHER SPECIFIED POSTPROCEDURAL STATES: Chronic | ICD-10-CM

## 2022-10-14 DIAGNOSIS — N94.89 OTHER SPECIFIED CONDITIONS ASSOCIATED WITH FEMALE GENITAL ORGANS AND MENSTRUAL CYCLE: ICD-10-CM

## 2022-10-14 DIAGNOSIS — Z90.721 ACQUIRED ABSENCE OF OVARIES, UNILATERAL: Chronic | ICD-10-CM

## 2022-10-14 LAB
GLUCOSE BLDC GLUCOMTR-MCNC: 155 MG/DL — HIGH (ref 70–99)
GLUCOSE BLDC GLUCOMTR-MCNC: 170 MG/DL — HIGH (ref 70–99)
GLUCOSE BLDC GLUCOMTR-MCNC: 171 MG/DL — HIGH (ref 70–99)
GLUCOSE BLDC GLUCOMTR-MCNC: 178 MG/DL — HIGH (ref 70–99)
HCG UR QL: NEGATIVE — SIGNIFICANT CHANGE UP

## 2022-10-14 PROCEDURE — 88307 TISSUE EXAM BY PATHOLOGIST: CPT | Mod: 26

## 2022-10-14 PROCEDURE — 58150 TOTAL HYSTERECTOMY: CPT

## 2022-10-14 PROCEDURE — 88112 CYTOPATH CELL ENHANCE TECH: CPT | Mod: 26

## 2022-10-14 DEVICE — LIGATING CLIPS WECK HORIZON LARGE (ORANGE) 24: Type: IMPLANTABLE DEVICE | Status: FUNCTIONAL

## 2022-10-14 DEVICE — LIGATING CLIPS WECK HORIZON MEDIUM (BLUE) 24: Type: IMPLANTABLE DEVICE | Status: FUNCTIONAL

## 2022-10-14 RX ORDER — SODIUM CHLORIDE 9 MG/ML
1000 INJECTION, SOLUTION INTRAVENOUS
Refills: 0 | Status: DISCONTINUED | OUTPATIENT
Start: 2022-10-14 | End: 2022-10-17

## 2022-10-14 RX ORDER — INSULIN LISPRO 100/ML
VIAL (ML) SUBCUTANEOUS
Refills: 0 | Status: DISCONTINUED | OUTPATIENT
Start: 2022-10-14 | End: 2022-10-17

## 2022-10-14 RX ORDER — SODIUM CHLORIDE 9 MG/ML
1000 INJECTION, SOLUTION INTRAVENOUS
Refills: 0 | Status: DISCONTINUED | OUTPATIENT
Start: 2022-10-14 | End: 2022-10-16

## 2022-10-14 RX ORDER — PREGABALIN 225 MG/1
1 CAPSULE ORAL
Qty: 0 | Refills: 0 | DISCHARGE

## 2022-10-14 RX ORDER — DEXTROSE 50 % IN WATER 50 %
25 SYRINGE (ML) INTRAVENOUS ONCE
Refills: 0 | Status: DISCONTINUED | OUTPATIENT
Start: 2022-10-14 | End: 2022-10-17

## 2022-10-14 RX ORDER — METFORMIN HYDROCHLORIDE 850 MG/1
0 TABLET ORAL
Qty: 0 | Refills: 0 | DISCHARGE

## 2022-10-14 RX ORDER — HYDROMORPHONE HYDROCHLORIDE 2 MG/ML
0.5 INJECTION INTRAMUSCULAR; INTRAVENOUS; SUBCUTANEOUS
Refills: 0 | Status: DISCONTINUED | OUTPATIENT
Start: 2022-10-14 | End: 2022-10-14

## 2022-10-14 RX ORDER — OXYCODONE HYDROCHLORIDE 5 MG/1
5 TABLET ORAL ONCE
Refills: 0 | Status: DISCONTINUED | OUTPATIENT
Start: 2022-10-14 | End: 2022-10-14

## 2022-10-14 RX ORDER — GLUCAGON INJECTION, SOLUTION 0.5 MG/.1ML
1 INJECTION, SOLUTION SUBCUTANEOUS ONCE
Refills: 0 | Status: DISCONTINUED | OUTPATIENT
Start: 2022-10-14 | End: 2022-10-17

## 2022-10-14 RX ORDER — SENNA PLUS 8.6 MG/1
1 TABLET ORAL AT BEDTIME
Refills: 0 | Status: DISCONTINUED | OUTPATIENT
Start: 2022-10-14 | End: 2022-10-17

## 2022-10-14 RX ORDER — DEXTROSE 50 % IN WATER 50 %
12.5 SYRINGE (ML) INTRAVENOUS ONCE
Refills: 0 | Status: DISCONTINUED | OUTPATIENT
Start: 2022-10-14 | End: 2022-10-17

## 2022-10-14 RX ORDER — KETOROLAC TROMETHAMINE 30 MG/ML
30 SYRINGE (ML) INJECTION EVERY 6 HOURS
Refills: 0 | Status: DISCONTINUED | OUTPATIENT
Start: 2022-10-14 | End: 2022-10-15

## 2022-10-14 RX ORDER — DEXTROSE 50 % IN WATER 50 %
15 SYRINGE (ML) INTRAVENOUS ONCE
Refills: 0 | Status: DISCONTINUED | OUTPATIENT
Start: 2022-10-14 | End: 2022-10-17

## 2022-10-14 RX ORDER — ONDANSETRON 8 MG/1
4 TABLET, FILM COATED ORAL ONCE
Refills: 0 | Status: DISCONTINUED | OUTPATIENT
Start: 2022-10-14 | End: 2022-10-14

## 2022-10-14 RX ORDER — SODIUM CHLORIDE 9 MG/ML
1000 INJECTION, SOLUTION INTRAVENOUS
Refills: 0 | Status: DISCONTINUED | OUTPATIENT
Start: 2022-10-14 | End: 2022-10-14

## 2022-10-14 RX ORDER — ACETAMINOPHEN 500 MG
1000 TABLET ORAL ONCE
Refills: 0 | Status: DISCONTINUED | OUTPATIENT
Start: 2022-10-14 | End: 2022-10-17

## 2022-10-14 RX ORDER — LISINOPRIL 2.5 MG/1
1 TABLET ORAL
Qty: 0 | Refills: 0 | DISCHARGE

## 2022-10-14 RX ORDER — ONDANSETRON 8 MG/1
4 TABLET, FILM COATED ORAL ONCE
Refills: 0 | Status: COMPLETED | OUTPATIENT
Start: 2022-10-14 | End: 2022-10-14

## 2022-10-14 RX ORDER — SIMETHICONE 80 MG/1
80 TABLET, CHEWABLE ORAL EVERY 6 HOURS
Refills: 0 | Status: DISCONTINUED | OUTPATIENT
Start: 2022-10-14 | End: 2022-10-17

## 2022-10-14 RX ORDER — ONDANSETRON 8 MG/1
4 TABLET, FILM COATED ORAL EVERY 6 HOURS
Refills: 0 | Status: DISCONTINUED | OUTPATIENT
Start: 2022-10-14 | End: 2022-10-17

## 2022-10-14 RX ORDER — CHOLECALCIFEROL (VITAMIN D3) 125 MCG
1 CAPSULE ORAL
Qty: 0 | Refills: 0 | DISCHARGE

## 2022-10-14 RX ORDER — HYDROMORPHONE HYDROCHLORIDE 2 MG/ML
1 INJECTION INTRAMUSCULAR; INTRAVENOUS; SUBCUTANEOUS
Refills: 0 | Status: DISCONTINUED | OUTPATIENT
Start: 2022-10-14 | End: 2022-10-14

## 2022-10-14 RX ORDER — METOPROLOL TARTRATE 50 MG
5 TABLET ORAL EVERY 6 HOURS
Refills: 0 | Status: DISCONTINUED | OUTPATIENT
Start: 2022-10-14 | End: 2022-10-16

## 2022-10-14 RX ORDER — HYDROMORPHONE HYDROCHLORIDE 2 MG/ML
1 INJECTION INTRAMUSCULAR; INTRAVENOUS; SUBCUTANEOUS EVERY 4 HOURS
Refills: 0 | Status: DISCONTINUED | OUTPATIENT
Start: 2022-10-14 | End: 2022-10-14

## 2022-10-14 RX ORDER — PANTOPRAZOLE SODIUM 20 MG/1
40 TABLET, DELAYED RELEASE ORAL DAILY
Refills: 0 | Status: DISCONTINUED | OUTPATIENT
Start: 2022-10-14 | End: 2022-10-16

## 2022-10-14 RX ORDER — HEPARIN SODIUM 5000 [USP'U]/ML
5000 INJECTION INTRAVENOUS; SUBCUTANEOUS EVERY 8 HOURS
Refills: 0 | Status: DISCONTINUED | OUTPATIENT
Start: 2022-10-14 | End: 2022-10-16

## 2022-10-14 RX ADMIN — HYDROMORPHONE HYDROCHLORIDE 1 MILLIGRAM(S): 2 INJECTION INTRAMUSCULAR; INTRAVENOUS; SUBCUTANEOUS at 13:09

## 2022-10-14 RX ADMIN — Medication 30 MILLIGRAM(S): at 18:28

## 2022-10-14 RX ADMIN — Medication 30 MILLIGRAM(S): at 19:00

## 2022-10-14 RX ADMIN — HEPARIN SODIUM 5000 UNIT(S): 5000 INJECTION INTRAVENOUS; SUBCUTANEOUS at 16:40

## 2022-10-14 RX ADMIN — Medication 1: at 17:27

## 2022-10-14 RX ADMIN — HYDROMORPHONE HYDROCHLORIDE 1 MILLIGRAM(S): 2 INJECTION INTRAMUSCULAR; INTRAVENOUS; SUBCUTANEOUS at 13:30

## 2022-10-14 RX ADMIN — Medication 5 MILLIGRAM(S): at 18:28

## 2022-10-14 RX ADMIN — SODIUM CHLORIDE 125 MILLILITER(S): 9 INJECTION, SOLUTION INTRAVENOUS at 13:00

## 2022-10-14 RX ADMIN — ONDANSETRON 4 MILLIGRAM(S): 8 TABLET, FILM COATED ORAL at 16:40

## 2022-10-14 RX ADMIN — SODIUM CHLORIDE 125 MILLILITER(S): 9 INJECTION, SOLUTION INTRAVENOUS at 16:40

## 2022-10-14 RX ADMIN — PANTOPRAZOLE SODIUM 40 MILLIGRAM(S): 20 TABLET, DELAYED RELEASE ORAL at 16:41

## 2022-10-14 NOTE — H&P ADULT - NSICDXPASTMEDICALHX_GEN_ALL_CORE_FT
PAST MEDICAL HISTORY:  Anemia     Chronic hypertension     DM (diabetes mellitus)     Obesity     Ovarian cyst

## 2022-10-14 NOTE — BRIEF OPERATIVE NOTE - OPERATION/FINDINGS
EUA: Globular uterus approx 12cm with large right adnexal mass filling cul-de-sac, immobile. Rectovaginal exam with smooth rectovaginal septum and normal parametria.     Open: 10cm right adnexal mass with simple appearance. Globular uterus approx 10cm. Filmy adhesion between posterior uterine wall and sigmoid. Surgically absent left ovary and fallopian tube.

## 2022-10-14 NOTE — DISCHARGE NOTE PROVIDER - PROVIDER TOKENS
FREE:[LAST:[[facility]],PHONE:[(   )    -],FAX:[(   )    -],ADDRESS:[[facility]  [address] [place] [address]  Ambulatory Care Unit  Oncology Building, Level C  [phone]],FOLLOWUP:[2 weeks],ESTABLISHEDPATIENT:[T]] FREE:[LAST:[[facility]],PHONE:[(   )    -],FAX:[(   )    -],ADDRESS:[[facility]  [address] [place] [address]  Ambulatory Care Unit  Oncology Building, Level C  [phone]],SCHEDULEDAPPT:[11/02/2022],SCHEDULEDAPPTTIME:[01:00 PM],ESTABLISHEDPATIENT:[T]]

## 2022-10-14 NOTE — DISCHARGE NOTE PROVIDER - NSDCMRMEDTOKEN_GEN_ALL_CORE_FT
Colace 100 mg oral capsule: 1 cap(s) orally 2 times a day, As Needed - for constipation  lisinopril 40 mg oral tablet: 1 tab(s) orally once a day  metFORMIN 500 mg oral tablet: orally once a day am  Slow Release Iron 160 mg (50 mg elemental iron) oral tablet, extended release: 1 tab(s) orally once a day  Vitamin B-12 500 mcg oral tablet: 1 tab(s) orally once a day  Vitamin D3 25 mcg (1000 intl units) oral tablet: 1 tab(s) orally once a day   acetaminophen 325 mg oral tablet: 3 tab(s) orally every 6 hours, As Needed  ibuprofen 600 mg oral tablet: 1 tab(s) orally every 6 hours, As Needed  lisinopril 40 mg oral tablet: 1 tab(s) orally once a day  metFORMIN 500 mg oral tablet: orally once a day am  oxyCODONE 5 mg oral tablet: 1 tab(s) orally every 6 hours, As Needed MDD:4  Vitamin B-12 500 mcg oral tablet: 1 tab(s) orally once a day  Vitamin D3 25 mcg (1000 intl units) oral tablet: 1 tab(s) orally once a day

## 2022-10-14 NOTE — DISCHARGE NOTE PROVIDER - NSDCFUSCHEDAPPT_GEN_ALL_CORE_FT
[facility]  CR[facility]  [id number]  Scheduled Appointment: 11/02/2022

## 2022-10-14 NOTE — DISCHARGE NOTE PROVIDER - NSDCFUADDINST_GEN_ALL_CORE_FT
Postoperative Instructions      Pain control     For pain control, take the followin. Motrin 600mg four times a day, take with food  2. Add Tylenol 975 four times a day, alternated with motrin  3. Add oxycodone as needed for severe pain not managed well by motrin and tylenol. A prescription has been sent to your pharmacy on file.     Motrin and Tylenol can be obtained over the counter.      Incision Care  Keep your incision(s) clean and dry. It is ok to shower, but do not scrub the incision sites--just allow the water to gently wash over your skin. You have Dermabond Prineo dressing on your incision. This is waterproof and will be removed at your postoperative appointment.     Postoperative restrictions   Do not drive or make important decisions for 24 hours after anesthesia. You may feel drowsy for 24 hours and should have a responsible adult with you during that time. Nothing in the vagina (tampons, sexual intercourse), No tub baths, pools or hot tubs for 8 weeks (showers are ok!). No lifting anything heavier than 15 lbs, no strenuous exercise for 8 weeks after surgery. Do not pull or cut any stitches that you see around your incision.         Vaginal bleeding   Spotting and intermittent passage of blood clots per vagina is normal in first few weeks after surgery. If you are soaking 1 pad per hour, that is not normal and you should notify the clinic and seek medical attention right away.      Vaginal discharge   Vaginal discharge (all colors) is normal after vaginal surgery. You may have vaginal discharge during this time. This is normal.      Signs of Infection  Some postoperative pain and discomfort is normal. However, if you experience any of the following, you may be developing an infection and should be seen by your doctor: pain that does not get better with the oral medications listed above, fever greater than 100.4F, foul smelling discharge coming from the surgical site, nausea and vomiting that does not stop (especially if you are unable to tolerate oral intake), or inability to urinate. If you experience any of these symptoms, call your doctor's office to be seen right away.    Follow Up  Call the clinic office to schedule a postoperative appointment in 2 weeks. The results from the procedure will be discussed with you at that time.     [facility]  [address] [place] [address]  Ambulatory Care Unit  Oncology Building, Level C  [phone]

## 2022-10-14 NOTE — PROGRESS NOTE ADULT - ASSESSMENT
36yo female s/p Exploratory laparotomy, Total abdominal hysterectomy, Right salpingo-oophorectomy (frozen: benign) in stable condition. See operative note for details.   -LR@125  -CLD  -HSQ q 8hrs  -barber catheter overnight  -VS per routine  -Encourage incentive spirometer use  -Ambulate as tolerated  -bilat venodynes when resting in bed  -Tylenol, Toradol, Dilaudid prn   -Protonix 40mg qd  -Mylicon/Senna   -Zofran 4mg prn  -AM CBC, BMP-replete lytes prn  dispo: admit to 4T for routine post operative care  d/w gyn/onc team    Adan Overton PA-C  #[id number] 38yo female s/p Exploratory laparotomy, Total abdominal hysterectomy, Right salpingo-oophorectomy (frozen: benign) in stable condition. See operative note for details.   -LR@125  -CLD (CC)  -HSQ q 8hrs  -barber catheter overnight  -VS per routine  -ISS, glucose monitoring (T2DM)  -Lopresssor 5mg q 6hrs prn (HTN)  -Encourage incentive spirometer use  -Ambulate as tolerated  -bilat venodynes when resting in bed  -Tylenol, Toradol, Dilaudid prn   -Protonix 40mg qd  -Mylicon/Senna   -Zofran 4mg prn  -AM CBC, BMP-replete lytes prn  dispo: admit to 4T for routine post operative care  d/w gyn/onc team    Adan Overton PA-C  #[id number]

## 2022-10-14 NOTE — DISCHARGE NOTE PROVIDER - HOSPITAL COURSE
36 y/o w/ PMHx of hypertension and type 2 diabetes, Patient presented for scheduled procedure. She an underwent an uncomplicated exploratory laparotomy, total abdominal hysterectomy, right salpingo-oophorectomy (frozen=benign). Please see operative note for full details. Patient was transferred to recovery room in stable condition. On POD#1, White catheter was discontinued and patient voided without issues. Patient's diet was advanced and she tolerated regular diet without issues.       Labs drawn post-operatively and on POD#1 were stable compared to pre-op.     POD#2 routine post-operative care was performed.  No notable events.   On POD#3 patient was deemed stable for discharge as she was meeting postoperative milestones - tolerating regular diet, ambulating without difficulty, voiding, and pain was well controlled on oral medications. Throughout admission, patient received prophylactic anticoagulation.  Patient was instructed to follow up with Dr. Goldberg in 2 weeks. 36 y/o w/ PMHx of hypertension and type 2 diabetes, Patient presented for scheduled procedure. She an underwent an uncomplicated exploratory laparotomy, total abdominal hysterectomy, right salpingo-oophorectomy (frozen=benign). Please see operative note for full details. Patient was transferred to recovery room in stable condition. On POD#1, White catheter was discontinued and patient voided without issues. Patient's diet was advanced and she tolerated regular diet without issues.       Labs drawn post-operatively and on POD#1 were stable compared to pre-op.     POD#2 routine post-operative care was performed.  No notable events.   On POD#3 patient was deemed stable for discharge as she was meeting postoperative milestones - tolerating regular diet, ambulating without difficulty, voiding, and pain was well controlled on oral medications. Throughout admission, patient received prophylactic anticoagulation.  Patient was instructed to follow up with GYN ONC clinic in 2 weeks (11/2 @ 1pm).

## 2022-10-14 NOTE — DISCHARGE NOTE PROVIDER - NSDCACTIVITY_GEN_ALL_CORE
No restrictions/Showering allowed/Stairs allowed/Walking - Indoors allowed/Walking - Outdoors allowed/Follow Instructions Provided by your Surgical Team

## 2022-10-14 NOTE — DISCHARGE NOTE PROVIDER - NSRESEARCHGRANT_MLMHIDDEN_GEN_A_CORE
Patient is a 60y old  Female who presents with a chief complaint of SOB, LE pain (23 Jun 2020 11:52)      SUBJECTIVE / OVERNIGHT EVENTS: Comfortable without new complaints.   Review of Systems   chest pain no  palpitations no  sob no  nausea no  headache no    MEDICATIONS  (STANDING):  aspirin enteric coated 81 milliGRAM(s) Oral daily  atorvastatin 20 milliGRAM(s) Oral at bedtime  buDESOnide   90 MICROgram(s) Inhaler 2 Puff(s) Inhalation two times a day  clopidogrel Tablet 75 milliGRAM(s) Oral daily  dextrose 5%. 1000 milliLiter(s) (50 mL/Hr) IV Continuous <Continuous>  dextrose 50% Injectable 12.5 Gram(s) IV Push once  dextrose 50% Injectable 25 Gram(s) IV Push once  dextrose 50% Injectable 25 Gram(s) IV Push once  heparin   Injectable 5000 Unit(s) SubCutaneous every 8 hours  insulin glargine Injectable (LANTUS) 10 Unit(s) SubCutaneous at bedtime  insulin lispro (HumaLOG) corrective regimen sliding scale   SubCutaneous three times a day before meals  insulin lispro (HumaLOG) corrective regimen sliding scale   SubCutaneous at bedtime  insulin lispro Injectable (HumaLOG) 2 Unit(s) SubCutaneous three times a day before meals  lisinopril 10 milliGRAM(s) Oral daily  metoprolol succinate ER 50 milliGRAM(s) Oral daily  oxycodone    5 mG/acetaminophen 325 mG 1 Tablet(s) Oral every 4 hours  sevelamer carbonate 800 milliGRAM(s) Oral three times a day with meals    MEDICATIONS  (PRN):  acetaminophen   Tablet .. 650 milliGRAM(s) Oral every 6 hours PRN Temp greater or equal to 38C (100.4F), Mild Pain (1 - 3), Moderate Pain (4 - 6)  dextrose 40% Gel 15 Gram(s) Oral once PRN Blood Glucose LESS THAN 70 milliGRAM(s)/deciliter  glucagon  Injectable 1 milliGRAM(s) IntraMuscular once PRN Glucose LESS THAN 70 milligrams/deciliter      Vital Signs Last 24 Hrs  T(C): 36.5 (23 Jun 2020 15:23), Max: 36.8 (22 Jun 2020 23:22)  T(F): 97.7 (23 Jun 2020 15:23), Max: 98.3 (23 Jun 2020 03:52)  HR: 74 (23 Jun 2020 15:23) (68 - 77)  BP: 158/75 (23 Jun 2020 15:23) (144/78 - 162/67)  BP(mean): --  RR: 18 (23 Jun 2020 15:23) (17 - 18)  SpO2: 100% (23 Jun 2020 15:23) (93% - 100%)    PHYSICAL EXAM:  GENERAL: NAD, well-developed  HEAD:  Atraumatic, Normocephalic  EYES: EOMI, PERRLA, conjunctiva and sclera clear  NECK: Supple, No JVD  CHEST/LUNG: Clear to auscultation bilaterally; No wheeze  HEART: Regular rate and rhythm; No murmurs, rubs, or gallops  ABDOMEN: Soft, Nontender, Nondistended; Bowel sounds present  EXTREMITIES:  2+ Peripheral Pulses, No clubbing, cyanosis, or edema  PSYCH: AAOx3  NEUROLOGY: non-focal  SKIN: No rashes or lesions    LABS:                        10.5   4.01  )-----------( 230      ( 22 Jun 2020 06:44 )             34.0     06-23    130<L>  |  94<L>  |  15  ----------------------------<  187<H>  3.9   |  24  |  3.67<H>    Ca    9.0      23 Jun 2020 05:30  Mg     2.2     06-23                  RADIOLOGY & ADDITIONAL TESTS:    Imaging Personally Reviewed:  < from: [facility] Duplex Lower Ext Vein ScanVictoriano (06.22.20 @ 17:01) >    IMPRESSION:   No evidence of deep venous thrombosis in either lower extremity.    < end of copied text >    Consultant(s) Notes Reviewed:      Care Discussed with Consultants/Other Providers: yes

## 2022-10-14 NOTE — PROGRESS NOTE ADULT - SUBJECTIVE AND OBJECTIVE BOX
GYNECOLOGIC ONCOLOGY PA POST OP  NOTE    Pt seen and examined at bedside, resting comfortably. Patient c/o abdominal pain earlier (R>L) which has since subsided with one dose of Dilaudid 1mg IVP. patient denies fever, chills, headache, dizziness, nausea, vomiting, chest pain, palpitations and sob. White in situ (clear yellow urine in bag). Patient has yet to trial po diet.     OBJECTIVE:     VITALS:  T(F): 97.9 (10-14-22 @ 15:00), Max: 99 (10-14-22 @ 12:10)  HR: 85 (10-14-22 @ 15:00) (85 - 102)  BP: 125/79 (10-14-22 @ 15:00) (117/75 - 154/87)  RR: 19 (10-14-22 @ 15:00) (15 - 22)  SpO2: 97% (10-14-22 @ 15:00) (96% - 100%)  Wt(kg): --    I&O's Summary    14 Oct 2022 07:01  -  14 Oct 2022 15:14  --------------------------------------------------------  IN: 375 mL / OUT: 295 mL / NET: 80 mL        MEDICATIONS  (STANDING):  heparin   Injectable 5000 Unit(s) SubCutaneous every 8 hours  ketorolac   Injectable 30 milliGRAM(s) IV Push every 6 hours  lactated ringers. 1000 milliLiter(s) (125 mL/Hr) IV Continuous <Continuous>  lactated ringers. 1000 milliLiter(s) (75 mL/Hr) IV Continuous <Continuous>  ondansetron Injectable 4 milliGRAM(s) IV Push once  pantoprazole  Injectable 40 milliGRAM(s) IV Push daily    MEDICATIONS  (PRN):  HYDROmorphone  Injectable 0.5 milliGRAM(s) IV Push every 10 minutes PRN Moderate Pain (4 - 6)  HYDROmorphone  Injectable 1 milliGRAM(s) IV Push every 10 minutes PRN Severe Pain (7 - 10)  ondansetron Injectable 4 milliGRAM(s) IV Push once PRN Nausea and/or Vomiting  oxyCODONE    IR 5 milliGRAM(s) Oral once PRN Moderate Pain (4 - 6)  senna 1 Tablet(s) Oral at bedtime PRN Constipation  simethicone 80 milliGRAM(s) Chew every 6 hours PRN Gas      Physical Exam:  Constitutional: NAD  Pulmonary: clear to auscultation bilaterally   Cardiovascular: Regular rate and rhythm   Abdomen: soft,appropriately tender, midline vertical incision w/ dressing (bloodstains marked)  Extremities: no lower extremity edema or calve tenderness, bilat venodynes

## 2022-10-14 NOTE — ASU PREOP CHECKLIST - 5.
Patient is currently menstruating dispossable underpants and pad in use Patient is currently menstruating disposable underpants and pad in use

## 2022-10-14 NOTE — DISCHARGE NOTE PROVIDER - CARE PROVIDER_API CALL
[facility],   [facility]  [address] [place] [address]  Ambulatory Care Unit  Oncology Building, Level C  [phone]  Phone: (   )    -  Fax: (   )    -  Established Patient  Follow Up Time: 2 weeks   [facility],   [facility]  [address] [place] [address]  Ambulatory Care Unit  Oncology Building, Level C  [phone]  Phone: (   )    -  Fax: (   )    -  Established Patient  Scheduled Appointment: 11/02/2022 01:00 PM

## 2022-10-14 NOTE — BRIEF OPERATIVE NOTE - NSICDXBRIEFPROCEDURE_GEN_ALL_CORE_FT
PROCEDURES:  Total abdominal hysterectomy 14-Oct-2022 13:01:18  Elena Anderson  Right salpingoophorectomy 14-Oct-2022 13:01:33  Elean Anderson

## 2022-10-14 NOTE — H&P ADULT - HISTORY OF PRESENT ILLNESS
36yo  LMP  referred to GynOnc from Gyn for evaluation of adnexal mass and elevated tumor markers.     Patient initially presented to [facility] ED on 8/15 with lower abdominal tenderness and vaginal bleeding. GYN consulted to rule out torsion. Patient H/H at the time was 6.5/24.7, otherwise asymptomatic. Pt admitted to CDU, given 1u pRBC with appropriate rise to 7.9/29.4. She was discharged to follow-up outpatient for surgical scheduling.     Patient followed up with Gyn on 22 for workup of AUB and right adnexal mass seen on CT in [facility] ED (8/15/22). She was scheduled for surgery after tumor markers and medical clearance. Patient received medical clearance. Her tumor markers were notable for elevation in  and CA 19-9 (see below). Of note, pt with elevated  after LSO in 2019 (at [facility]) though it was never followed.     OB:  x1, TOP x3 s/p D&C x3  GYN: as above; s/p [facility] LSO  (presented to ED with concern for torsion); denies abnormal pap smears, STIs;  PMH: HTN, T2DM (Cards = Carmen)  PSH: [facility] LSO 2019, D&C x3  Social: lives in [place], though has support here   Meds: Metformin, Lisinopril   All: NKDA        CTAP (8/15): Enlarged, globular uterus with tiny low-attenuation foci consistent with known adenomyosis. Large septated right adnexal cyst measuring up to 8.8 cm. This is inseparable from the base of the cecum. Enlarged right lower quadrant mesenteric lymph nodes. Differential diagnosis includes mesenteric adenitis line. Nonobstructive left renal calculus    TVUS (8/15): Uterus: 12.7 cm x 8.8 cm x 8.7 cm. Enlarged heterogeneous uterus   compatible with adenomyosis. Endometrium: 7 mm. Indistinct endometrial myometrial junction  Right ovary: Seen transabdominally only. Mildly complex 9.5 x 6.7 x 10.2 cm cyst contains mural nodularity and thin internal septations Left ovary: Left oophorectomy Fluid: None.

## 2022-10-14 NOTE — H&P ADULT - ASSESSMENT
36yo  LMP  referred to GynOnc from Gyn for evaluation of right adnexal mass and elevated tumor markers c/f ovarian malignancy vs benign ovarian cyst.  - Plan for ex-lap DINO, RSO, possible staging, para-aortic LND, bowel resection, and removal of disease tissue        Clayton Garsia, PGY1

## 2022-10-14 NOTE — CHART NOTE - NSCHARTNOTEFT_GEN_A_CORE
Patient seen and examined at bedside, recently post-op. Patient is easily arousable. No acute concerns at this time.   Patient w/ barber in situ.  Patient not yet ambulated.  Patient has not had anything to eat or drink, still to sleepy.    Denies CP, SOB, N/V, fevers, and chills.    Vital Signs Last 24 Hours  T(C): 36.3 (10-14-22 @ 16:24), Max: 37.2 (10-14-22 @ 12:10)  HR: 94 (10-14-22 @ 16:24) (85 - 102)  BP: 167/94 (10-14-22 @ 16:24) (117/75 - 167/94)  RR: 16 (10-14-22 @ 16:24) (15 - 22)  SpO2: 98% (10-14-22 @ 16:24) (96% - 100%)    I&O's Summary    14 Oct 2022 07:01  -  14 Oct 2022 16:52  --------------------------------------------------------  IN: 375 mL / OUT: 295 mL / NET: 80 mL        Physical Exam:  Gen: Comfortable, NAD  Psych: appropriate mood & affect  CV: RRR  Pulm: CTAB  Abd: Soft, appropriately tender, non-distended, +BS  Incision: midline vertical incision with overlaying bandage with scant serosanguinous fluid, not saturated   : no spotting on pad  Ext: Warm & well perfused. No edema or tenderness bilaterally    Labs:      MEDICATIONS  (STANDING):  heparin   Injectable 5000 Unit(s) SubCutaneous every 8 hours  ketorolac   Injectable 30 milliGRAM(s) IV Push every 6 hours  lactated ringers. 1000 milliLiter(s) (125 mL/Hr) IV Continuous <Continuous>  metoprolol tartrate Injectable 5 milliGRAM(s) IV Push every 6 hours  pantoprazole  Injectable 40 milliGRAM(s) IV Push daily    MEDICATIONS  (PRN):  senna 1 Tablet(s) Oral at bedtime PRN Constipation  simethicone 80 milliGRAM(s) Chew every 6 hours PRN Gas      A/P: 36yo POD#0 s/p Exploratory laparotomy, Total abdominal hysterectomy, Right salpingo-oophorectomy (frozen: benign) in stable condition.     Neuro: Pain well controlled. Continue Toradol, Tylenol. Oxy PO PRN for breakthrough only.   CV: Hemodynamically stable. F/u AM CBC.  - h/o HTN continue Lopressor PRN  Pulm: O2 sat WNL on RA, Increase ambulation, encourage incentive spirometry use  GI: CC-CLD. Protonix, Senna, Simethicone, Zofran PRN  : Barber till AM  Heme: DVT ppx: HSQ, SCDs while in bed  ID: Afebrile, No signs of infection  Endo: ISS.   FEN: LR@125, SLIV once tolerating PO, Replete electrolytes PRN   Dispo: Continued inpt care    Elena Roach, PGY-3        D/W Dr Goldberg

## 2022-10-14 NOTE — PATIENT PROFILE ADULT - FALL HARM RISK - HARM RISK INTERVENTIONS

## 2022-10-14 NOTE — H&P ADULT - NSHPPHYSICALEXAM_GEN_ALL_CORE
Constitutional - General appearance: No acute distress, well appearing and well nourished   Neck - Neck: Supple, symmetric, trachea midline, no masses. Thyroid: Normal, no thyromegaly.   Respiratory - Assessment of Respiratory effort: No increased work of breathing or signs of respiratory distress. Auscultation of lungs: Clear to auscultation.   Cardiovascular - Auscultation of heart: Normal rate and rhythm, normal S1 and S2, no murmurs. Peripheral vascular exam: Normal. Examination of extremities for edema and/or varicosities: Normal.   Abdomen - Masses/Tenderness: Non-tender, no masses. Liver and spleen: No hepatomegaly or splenomegaly. Examination for hernias: No hernia appreciated.   Lymphatic - no adenopathy noted in supraclavicular lymph nodes. no adenopathy noted in axillary lymph nodes. no adenopathy noted in inguinal lymph nodes.   Skin - Skin and subcutaneous tissue: Normal without rashes or lesions. Palpation of skin and subcutaneous tissue: Normal turgor.   Neurological/Psychiatric - Description of patient's judgment and insight: Normal. Orientation to person, place, and time: Normal. Recent and remote memory: Intact. Mood and affect: Normal. No focal neurologic defects observed.   Gynecologic - Breasts: Normal. External genitalia: Normal, no lesions appreciated. Ureathral Meatus: Normal. Urethra: Normal, no discharge. Bladder: Not distended, no tenderness. Vagina: Normal. Cervix: Normal, no lesions. Uterus: Abnormal. abdomino-pelvic mass inseparable from adnexae. Adnexa(ae): Abnormal. Bimanual Exam: Abnormal. Large, nontender, solid and immobile pelvvic mass filling cul-de-sac. Anus and perineum: Normal sphincter tone, no masses, no prolapse. Recto-Vaginal Exam: Normal.

## 2022-10-14 NOTE — DISCHARGE NOTE PROVIDER - NSDCCPTREATMENT_GEN_ALL_CORE_FT
PRINCIPAL PROCEDURE  Procedure: Total abdominal hysterectomy  Findings and Treatment:       SECONDARY PROCEDURE  Procedure: Right salpingoophorectomy  Findings and Treatment:

## 2022-10-15 ENCOUNTER — TRANSCRIPTION ENCOUNTER (OUTPATIENT)
Age: 37
End: 2022-10-15

## 2022-10-15 LAB
ANION GAP SERPL CALC-SCNC: 11 MMOL/L — SIGNIFICANT CHANGE UP (ref 7–14)
BASOPHILS # BLD AUTO: 0.03 K/UL — SIGNIFICANT CHANGE UP (ref 0–0.2)
BASOPHILS NFR BLD AUTO: 0.3 % — SIGNIFICANT CHANGE UP (ref 0–2)
BUN SERPL-MCNC: 7 MG/DL — SIGNIFICANT CHANGE UP (ref 7–23)
CALCIUM SERPL-MCNC: 8.9 MG/DL — SIGNIFICANT CHANGE UP (ref 8.4–10.5)
CHLORIDE SERPL-SCNC: 105 MMOL/L — SIGNIFICANT CHANGE UP (ref 98–107)
CO2 SERPL-SCNC: 23 MMOL/L — SIGNIFICANT CHANGE UP (ref 22–31)
CREAT SERPL-MCNC: 0.64 MG/DL — SIGNIFICANT CHANGE UP (ref 0.5–1.3)
EGFR: 115 ML/MIN/1.73M2 — SIGNIFICANT CHANGE UP
EOSINOPHIL # BLD AUTO: 0.03 K/UL — SIGNIFICANT CHANGE UP (ref 0–0.5)
EOSINOPHIL NFR BLD AUTO: 0.3 % — SIGNIFICANT CHANGE UP (ref 0–6)
GLUCOSE BLDC GLUCOMTR-MCNC: 111 MG/DL — HIGH (ref 70–99)
GLUCOSE BLDC GLUCOMTR-MCNC: 114 MG/DL — HIGH (ref 70–99)
GLUCOSE BLDC GLUCOMTR-MCNC: 122 MG/DL — HIGH (ref 70–99)
GLUCOSE BLDC GLUCOMTR-MCNC: 200 MG/DL — HIGH (ref 70–99)
GLUCOSE SERPL-MCNC: 103 MG/DL — HIGH (ref 70–99)
HCT VFR BLD CALC: 31.7 % — LOW (ref 34.5–45)
HGB BLD-MCNC: 9 G/DL — LOW (ref 11.5–15.5)
IANC: 6.4 K/UL — SIGNIFICANT CHANGE UP (ref 1.8–7.4)
IMM GRANULOCYTES NFR BLD AUTO: 0.3 % — SIGNIFICANT CHANGE UP (ref 0–0.9)
LYMPHOCYTES # BLD AUTO: 3.64 K/UL — HIGH (ref 1–3.3)
LYMPHOCYTES # BLD AUTO: 34.3 % — SIGNIFICANT CHANGE UP (ref 13–44)
MAGNESIUM SERPL-MCNC: 1.7 MG/DL — SIGNIFICANT CHANGE UP (ref 1.6–2.6)
MCHC RBC-ENTMCNC: 20.9 PG — LOW (ref 27–34)
MCHC RBC-ENTMCNC: 28.4 GM/DL — LOW (ref 32–36)
MCV RBC AUTO: 73.5 FL — LOW (ref 80–100)
MONOCYTES # BLD AUTO: 0.49 K/UL — SIGNIFICANT CHANGE UP (ref 0–0.9)
MONOCYTES NFR BLD AUTO: 4.6 % — SIGNIFICANT CHANGE UP (ref 2–14)
NEUTROPHILS # BLD AUTO: 6.4 K/UL — SIGNIFICANT CHANGE UP (ref 1.8–7.4)
NEUTROPHILS NFR BLD AUTO: 60.2 % — SIGNIFICANT CHANGE UP (ref 43–77)
NRBC # BLD: 0 /100 WBCS — SIGNIFICANT CHANGE UP (ref 0–0)
NRBC # FLD: 0 K/UL — SIGNIFICANT CHANGE UP (ref 0–0)
PHOSPHATE SERPL-MCNC: 3.6 MG/DL — SIGNIFICANT CHANGE UP (ref 2.5–4.5)
PLATELET # BLD AUTO: 247 K/UL — SIGNIFICANT CHANGE UP (ref 150–400)
POTASSIUM SERPL-MCNC: 3.8 MMOL/L — SIGNIFICANT CHANGE UP (ref 3.5–5.3)
POTASSIUM SERPL-SCNC: 3.8 MMOL/L — SIGNIFICANT CHANGE UP (ref 3.5–5.3)
RBC # BLD: 4.31 M/UL — SIGNIFICANT CHANGE UP (ref 3.8–5.2)
RBC # FLD: 15.4 % — HIGH (ref 10.3–14.5)
SODIUM SERPL-SCNC: 139 MMOL/L — SIGNIFICANT CHANGE UP (ref 135–145)
WBC # BLD: 10.62 K/UL — HIGH (ref 3.8–10.5)
WBC # FLD AUTO: 10.62 K/UL — HIGH (ref 3.8–10.5)

## 2022-10-15 RX ORDER — ACETAMINOPHEN 500 MG
1000 TABLET ORAL ONCE
Refills: 0 | Status: COMPLETED | OUTPATIENT
Start: 2022-10-15 | End: 2022-10-15

## 2022-10-15 RX ORDER — ACETAMINOPHEN 500 MG
975 TABLET ORAL EVERY 6 HOURS
Refills: 0 | Status: DISCONTINUED | OUTPATIENT
Start: 2022-10-15 | End: 2022-10-17

## 2022-10-15 RX ORDER — SODIUM CHLORIDE 9 MG/ML
3 INJECTION INTRAMUSCULAR; INTRAVENOUS; SUBCUTANEOUS EVERY 8 HOURS
Refills: 0 | Status: DISCONTINUED | OUTPATIENT
Start: 2022-10-15 | End: 2022-10-17

## 2022-10-15 RX ORDER — IBUPROFEN 200 MG
600 TABLET ORAL EVERY 6 HOURS
Refills: 0 | Status: DISCONTINUED | OUTPATIENT
Start: 2022-10-15 | End: 2022-10-17

## 2022-10-15 RX ORDER — OXYCODONE HYDROCHLORIDE 5 MG/1
5 TABLET ORAL EVERY 8 HOURS
Refills: 0 | Status: DISCONTINUED | OUTPATIENT
Start: 2022-10-15 | End: 2022-10-17

## 2022-10-15 RX ADMIN — HEPARIN SODIUM 5000 UNIT(S): 5000 INJECTION INTRAVENOUS; SUBCUTANEOUS at 08:37

## 2022-10-15 RX ADMIN — Medication 5 MILLIGRAM(S): at 12:45

## 2022-10-15 RX ADMIN — SODIUM CHLORIDE 3 MILLILITER(S): 9 INJECTION INTRAMUSCULAR; INTRAVENOUS; SUBCUTANEOUS at 22:02

## 2022-10-15 RX ADMIN — Medication 30 MILLIGRAM(S): at 12:45

## 2022-10-15 RX ADMIN — Medication 5 MILLIGRAM(S): at 00:22

## 2022-10-15 RX ADMIN — Medication 1000 MILLIGRAM(S): at 03:45

## 2022-10-15 RX ADMIN — Medication 30 MILLIGRAM(S): at 06:06

## 2022-10-15 RX ADMIN — Medication 5 MILLIGRAM(S): at 06:05

## 2022-10-15 RX ADMIN — Medication 30 MILLIGRAM(S): at 00:22

## 2022-10-15 RX ADMIN — HEPARIN SODIUM 5000 UNIT(S): 5000 INJECTION INTRAVENOUS; SUBCUTANEOUS at 00:22

## 2022-10-15 RX ADMIN — HEPARIN SODIUM 5000 UNIT(S): 5000 INJECTION INTRAVENOUS; SUBCUTANEOUS at 19:08

## 2022-10-15 RX ADMIN — Medication 5 MILLIGRAM(S): at 19:08

## 2022-10-15 RX ADMIN — PANTOPRAZOLE SODIUM 40 MILLIGRAM(S): 20 TABLET, DELAYED RELEASE ORAL at 12:45

## 2022-10-15 RX ADMIN — SODIUM CHLORIDE 3 MILLILITER(S): 9 INJECTION INTRAMUSCULAR; INTRAVENOUS; SUBCUTANEOUS at 18:06

## 2022-10-15 RX ADMIN — Medication 30 MILLIGRAM(S): at 13:15

## 2022-10-15 RX ADMIN — SODIUM CHLORIDE 125 MILLILITER(S): 9 INJECTION, SOLUTION INTRAVENOUS at 08:37

## 2022-10-15 RX ADMIN — Medication 1000 MILLIGRAM(S): at 20:55

## 2022-10-15 RX ADMIN — Medication 400 MILLIGRAM(S): at 19:55

## 2022-10-15 RX ADMIN — SODIUM CHLORIDE 125 MILLILITER(S): 9 INJECTION, SOLUTION INTRAVENOUS at 06:05

## 2022-10-15 RX ADMIN — Medication 400 MILLIGRAM(S): at 03:28

## 2022-10-15 NOTE — PROGRESS NOTE ADULT - SUBJECTIVE AND OBJECTIVE BOX
R2 Gyn ONC Progress Note  POD#1  HD#2    Subjective:   Pt seen and examined at bedside. No events overnight. Pain well controlled. tolerating clear liquid diet. Pt denies fever, chills, chest pain, SOB, nausea, vomiting, lightheadedness, dizziness.      Objective:  T(F): 98.2 (10-15-22 @ 01:32), Max: 99.3 (10-14-22 @ 22:45)  HR: 68 (10-15-22 @ 01:32) (68 - 102)  BP: 146/74 (10-15-22 @ 01:32) (117/75 - 167/94)  RR: 18 (10-15-22 @ 01:32) (15 - 22)  SpO2: 100% (10-15-22 @ 01:32) (96% - 100%)  Wt(kg): --  I&O's Summary    14 Oct 2022 07:01  -  15 Oct 2022 05:16  --------------------------------------------------------  IN: 1650 mL / OUT: 545 mL / NET: 1105 mL      CAPILLARY BLOOD GLUCOSE      POCT Blood Glucose.: 178 mg/dL (14 Oct 2022 17:14)  POCT Blood Glucose.: 171 mg/dL (14 Oct 2022 16:29)  POCT Blood Glucose.: 155 mg/dL (14 Oct 2022 12:16)  POCT Blood Glucose.: 170 mg/dL (14 Oct 2022 06:49)      MEDICATIONS  (STANDING):  acetaminophen   IVPB .. 1000 milliGRAM(s) IV Intermittent once  dextrose 5%. 1000 milliLiter(s) (100 mL/Hr) IV Continuous <Continuous>  dextrose 5%. 1000 milliLiter(s) (50 mL/Hr) IV Continuous <Continuous>  dextrose 50% Injectable 25 Gram(s) IV Push once  dextrose 50% Injectable 12.5 Gram(s) IV Push once  dextrose 50% Injectable 25 Gram(s) IV Push once  glucagon  Injectable 1 milliGRAM(s) IntraMuscular once  heparin   Injectable 5000 Unit(s) SubCutaneous every 8 hours  insulin lispro (ADMELOG) corrective regimen sliding scale   SubCutaneous three times a day before meals  ketorolac   Injectable 30 milliGRAM(s) IV Push every 6 hours  lactated ringers. 1000 milliLiter(s) (125 mL/Hr) IV Continuous <Continuous>  metoprolol tartrate Injectable 5 milliGRAM(s) IV Push every 6 hours  pantoprazole  Injectable 40 milliGRAM(s) IV Push daily    MEDICATIONS  (PRN):  dextrose Oral Gel 15 Gram(s) Oral once PRN Blood Glucose LESS THAN 70 milliGRAM(s)/deciliter  ondansetron Injectable 4 milliGRAM(s) IV Push every 6 hours PRN Nausea  senna 1 Tablet(s) Oral at bedtime PRN Constipation  simethicone 80 milliGRAM(s) Chew every 6 hours PRN Gas      Physical Exam:  Constitutional: NAD, A+O x3  CV: RR S1S2 no m/r/g  Lungs: CTA b/l, good air flow b/l  Abdomen: soft, softly-distended, appropriately-tender. no guarding, no rebound, normal bowel sounds  Incision: clean, dry, intact  Extremities: no lower extremity edema or calf tenderness bilaterally; venodynes in place    LABS:         R2 Gyn ONC Progress Note  POD#1  HD#2    Subjective:   Pt seen and examined at bedside. No events overnight. Pain well controlled. tolerating clear liquid diet. Pt denies fever, chills, chest pain, SOB, nausea, vomiting, lightheadedness, dizziness.      Objective:  T(F): 98.2 (10-15-22 @ 01:32), Max: 99.3 (10-14-22 @ 22:45)  HR: 68 (10-15-22 @ 01:32) (68 - 102)  BP: 146/74 (10-15-22 @ 01:32) (117/75 - 167/94)  RR: 18 (10-15-22 @ 01:32) (15 - 22)  SpO2: 100% (10-15-22 @ 01:32) (96% - 100%)  Wt(kg): --  I&O's Summary    14 Oct 2022 07:01  -  15 Oct 2022 05:16  --------------------------------------------------------  IN: 1650 mL / OUT: 545 mL / NET: 1105 mL      CAPILLARY BLOOD GLUCOSE      POCT Blood Glucose.: 178 mg/dL (14 Oct 2022 17:14)  POCT Blood Glucose.: 171 mg/dL (14 Oct 2022 16:29)  POCT Blood Glucose.: 155 mg/dL (14 Oct 2022 12:16)  POCT Blood Glucose.: 170 mg/dL (14 Oct 2022 06:49)      MEDICATIONS  (STANDING):  acetaminophen   IVPB .. 1000 milliGRAM(s) IV Intermittent once  dextrose 5%. 1000 milliLiter(s) (100 mL/Hr) IV Continuous <Continuous>  dextrose 5%. 1000 milliLiter(s) (50 mL/Hr) IV Continuous <Continuous>  dextrose 50% Injectable 25 Gram(s) IV Push once  dextrose 50% Injectable 12.5 Gram(s) IV Push once  dextrose 50% Injectable 25 Gram(s) IV Push once  glucagon  Injectable 1 milliGRAM(s) IntraMuscular once  heparin   Injectable 5000 Unit(s) SubCutaneous every 8 hours  insulin lispro (ADMELOG) corrective regimen sliding scale   SubCutaneous three times a day before meals  ketorolac   Injectable 30 milliGRAM(s) IV Push every 6 hours  lactated ringers. 1000 milliLiter(s) (125 mL/Hr) IV Continuous <Continuous>  metoprolol tartrate Injectable 5 milliGRAM(s) IV Push every 6 hours  pantoprazole  Injectable 40 milliGRAM(s) IV Push daily    MEDICATIONS  (PRN):  dextrose Oral Gel 15 Gram(s) Oral once PRN Blood Glucose LESS THAN 70 milliGRAM(s)/deciliter  ondansetron Injectable 4 milliGRAM(s) IV Push every 6 hours PRN Nausea  senna 1 Tablet(s) Oral at bedtime PRN Constipation  simethicone 80 milliGRAM(s) Chew every 6 hours PRN Gas      Physical Exam:  Constitutional: NAD, A+O x3  CV: RR S1S2 no m/r/g  Lungs: CTA b/l, good air flow b/l  Abdomen: soft, softly-distended, appropriately-tender. no guarding, no rebound, normal bowel sounds  Incision: bandage overlaying midline vertical incision clean, dry, intact  Extremities: no lower extremity edema or calf tenderness bilaterally; venodynes in place    LABS:         R2 Gyn ONC Progress Note  POD#1  HD#2    Subjective:   Pt seen and examined at bedside. No events overnight. Pain well controlled. Tolerating clear liquid diet - few sips of water overnight, but mostly sleeping. Pt denies fever, chills, chest pain, SOB, nausea, vomiting, lightheadedness, dizziness.      Objective:  T(F): 98.2 (10-15-22 @ 01:32), Max: 99.3 (10-14-22 @ 22:45)  HR: 68 (10-15-22 @ 01:32) (68 - 102)  BP: 146/74 (10-15-22 @ 01:32) (117/75 - 167/94)  RR: 18 (10-15-22 @ 01:32) (15 - 22)  SpO2: 100% (10-15-22 @ 01:32) (96% - 100%)  Wt(kg): --  I&O's Summary    14 Oct 2022 07:01  -  15 Oct 2022 05:16  --------------------------------------------------------  IN: 1650 mL / OUT: 545 mL / NET: 1105 mL      CAPILLARY BLOOD GLUCOSE      POCT Blood Glucose.: 178 mg/dL (14 Oct 2022 17:14)  POCT Blood Glucose.: 171 mg/dL (14 Oct 2022 16:29)  POCT Blood Glucose.: 155 mg/dL (14 Oct 2022 12:16)  POCT Blood Glucose.: 170 mg/dL (14 Oct 2022 06:49)      MEDICATIONS  (STANDING):  acetaminophen   IVPB .. 1000 milliGRAM(s) IV Intermittent once  dextrose 5%. 1000 milliLiter(s) (100 mL/Hr) IV Continuous <Continuous>  dextrose 5%. 1000 milliLiter(s) (50 mL/Hr) IV Continuous <Continuous>  dextrose 50% Injectable 25 Gram(s) IV Push once  dextrose 50% Injectable 12.5 Gram(s) IV Push once  dextrose 50% Injectable 25 Gram(s) IV Push once  glucagon  Injectable 1 milliGRAM(s) IntraMuscular once  heparin   Injectable 5000 Unit(s) SubCutaneous every 8 hours  insulin lispro (ADMELOG) corrective regimen sliding scale   SubCutaneous three times a day before meals  ketorolac   Injectable 30 milliGRAM(s) IV Push every 6 hours  lactated ringers. 1000 milliLiter(s) (125 mL/Hr) IV Continuous <Continuous>  metoprolol tartrate Injectable 5 milliGRAM(s) IV Push every 6 hours  pantoprazole  Injectable 40 milliGRAM(s) IV Push daily    MEDICATIONS  (PRN):  dextrose Oral Gel 15 Gram(s) Oral once PRN Blood Glucose LESS THAN 70 milliGRAM(s)/deciliter  ondansetron Injectable 4 milliGRAM(s) IV Push every 6 hours PRN Nausea  senna 1 Tablet(s) Oral at bedtime PRN Constipation  simethicone 80 milliGRAM(s) Chew every 6 hours PRN Gas      Physical Exam:  Constitutional: NAD, A+O x3  CV: RR S1S2 no m/r/g  Lungs: CTA b/l, good air flow b/l  Abdomen: soft, softly-distended, appropriately-tender. no guarding, no rebound, normal bowel sounds  Incision: bandage overlaying midline vertical incision clean, dry, intact  Extremities: no lower extremity edema or calf tenderness bilaterally; venodynes in place    LABS:         R2 Gyn ONC Progress Note  POD#1  HD#2    Subjective:   Pt seen and examined at bedside. No events overnight. Pain well controlled. Tolerating clear liquid diet - few sips of water overnight, but mostly sleeping. Pt denies fever, chills, chest pain, SOB, nausea, vomiting, lightheadedness, dizziness.      Objective:  T(F): 98.2 (10-15-22 @ 01:32), Max: 99.3 (10-14-22 @ 22:45)  HR: 68 (10-15-22 @ 01:32) (68 - 102)  BP: 146/74 (10-15-22 @ 01:32) (117/75 - 167/94)  RR: 18 (10-15-22 @ 01:32) (15 - 22)  SpO2: 100% (10-15-22 @ 01:32) (96% - 100%)  Wt(kg): --  I&O's Summary    14 Oct 2022 07:01  -  15 Oct 2022 05:16  --------------------------------------------------------  IN: 1650 mL / OUT: 545 mL / NET: 1105 mL      CAPILLARY BLOOD GLUCOSE      POCT Blood Glucose.: 178 mg/dL (14 Oct 2022 17:14)  POCT Blood Glucose.: 171 mg/dL (14 Oct 2022 16:29)  POCT Blood Glucose.: 155 mg/dL (14 Oct 2022 12:16)  POCT Blood Glucose.: 170 mg/dL (14 Oct 2022 06:49)      MEDICATIONS  (STANDING):  acetaminophen   IVPB .. 1000 milliGRAM(s) IV Intermittent once  dextrose 5%. 1000 milliLiter(s) (100 mL/Hr) IV Continuous <Continuous>  dextrose 5%. 1000 milliLiter(s) (50 mL/Hr) IV Continuous <Continuous>  dextrose 50% Injectable 25 Gram(s) IV Push once  dextrose 50% Injectable 12.5 Gram(s) IV Push once  dextrose 50% Injectable 25 Gram(s) IV Push once  glucagon  Injectable 1 milliGRAM(s) IntraMuscular once  heparin   Injectable 5000 Unit(s) SubCutaneous every 8 hours  insulin lispro (ADMELOG) corrective regimen sliding scale   SubCutaneous three times a day before meals  ketorolac   Injectable 30 milliGRAM(s) IV Push every 6 hours  lactated ringers. 1000 milliLiter(s) (125 mL/Hr) IV Continuous <Continuous>  metoprolol tartrate Injectable 5 milliGRAM(s) IV Push every 6 hours  pantoprazole  Injectable 40 milliGRAM(s) IV Push daily    MEDICATIONS  (PRN):  dextrose Oral Gel 15 Gram(s) Oral once PRN Blood Glucose LESS THAN 70 milliGRAM(s)/deciliter  ondansetron Injectable 4 milliGRAM(s) IV Push every 6 hours PRN Nausea  senna 1 Tablet(s) Oral at bedtime PRN Constipation  simethicone 80 milliGRAM(s) Chew every 6 hours PRN Gas      Physical Exam:  Constitutional: NAD, A+O x3  CV: RR S1S2 no m/r/g  Lungs: CTA b/l, good air flow b/l  Abdomen: soft, softly-distended, appropriately-tender. no guarding, no rebound, normal bowel sounds  Incision: bandage overlaying midline vertical incision clean, dry, intact  Extremities: no lower extremity edema or calf tenderness bilaterally; venodynes in place  : White draining clear yellow urine    LABS:

## 2022-10-15 NOTE — PROGRESS NOTE ADULT - ASSESSMENT
A/P: 37y POD#1 s/p DINO BSO for AUB and R adnexal mass, recovering well post-operatively.     Neuro: IV Tylenol, Toradol, transition to PO when tolerating regular diet.  CV: f/u AM CBC. Hemodynamically stable.  Pulm: Saturating well on RA. Increase incentive spirometry.  GI: Consistent carb CLD; advance diet as tolerated.  : f/u AM BMP. LR@125, barber in place. Adequate UOP. d/c barber following AM labs.  Heme: Continue HSQ for DVT ppx. Increase OOB.    ID: Afebrile  Endo: ISS  Dispo: Continue inpatient post-operative care.    Milana Guerrero  PGY-2

## 2022-10-16 LAB
ANION GAP SERPL CALC-SCNC: 10 MMOL/L — SIGNIFICANT CHANGE UP (ref 7–14)
BUN SERPL-MCNC: 8 MG/DL — SIGNIFICANT CHANGE UP (ref 7–23)
CALCIUM SERPL-MCNC: 8.9 MG/DL — SIGNIFICANT CHANGE UP (ref 8.4–10.5)
CHLORIDE SERPL-SCNC: 106 MMOL/L — SIGNIFICANT CHANGE UP (ref 98–107)
CO2 SERPL-SCNC: 24 MMOL/L — SIGNIFICANT CHANGE UP (ref 22–31)
CREAT SERPL-MCNC: 0.55 MG/DL — SIGNIFICANT CHANGE UP (ref 0.5–1.3)
EGFR: 120 ML/MIN/1.73M2 — SIGNIFICANT CHANGE UP
GLUCOSE BLDC GLUCOMTR-MCNC: 105 MG/DL — HIGH (ref 70–99)
GLUCOSE BLDC GLUCOMTR-MCNC: 122 MG/DL — HIGH (ref 70–99)
GLUCOSE BLDC GLUCOMTR-MCNC: 132 MG/DL — HIGH (ref 70–99)
GLUCOSE BLDC GLUCOMTR-MCNC: 155 MG/DL — HIGH (ref 70–99)
GLUCOSE SERPL-MCNC: 117 MG/DL — HIGH (ref 70–99)
HCT VFR BLD CALC: 31.2 % — LOW (ref 34.5–45)
HGB BLD-MCNC: 9.1 G/DL — LOW (ref 11.5–15.5)
MCHC RBC-ENTMCNC: 21.4 PG — LOW (ref 27–34)
MCHC RBC-ENTMCNC: 29.2 GM/DL — LOW (ref 32–36)
MCV RBC AUTO: 73.4 FL — LOW (ref 80–100)
NRBC # BLD: 0 /100 WBCS — SIGNIFICANT CHANGE UP (ref 0–0)
NRBC # FLD: 0 K/UL — SIGNIFICANT CHANGE UP (ref 0–0)
PLATELET # BLD AUTO: 241 K/UL — SIGNIFICANT CHANGE UP (ref 150–400)
POTASSIUM SERPL-MCNC: 3.7 MMOL/L — SIGNIFICANT CHANGE UP (ref 3.5–5.3)
POTASSIUM SERPL-SCNC: 3.7 MMOL/L — SIGNIFICANT CHANGE UP (ref 3.5–5.3)
RBC # BLD: 4.25 M/UL — SIGNIFICANT CHANGE UP (ref 3.8–5.2)
RBC # FLD: 15.6 % — HIGH (ref 10.3–14.5)
SODIUM SERPL-SCNC: 140 MMOL/L — SIGNIFICANT CHANGE UP (ref 135–145)
WBC # BLD: 9.1 K/UL — SIGNIFICANT CHANGE UP (ref 3.8–10.5)
WBC # FLD AUTO: 9.1 K/UL — SIGNIFICANT CHANGE UP (ref 3.8–10.5)

## 2022-10-16 RX ORDER — ENOXAPARIN SODIUM 100 MG/ML
40 INJECTION SUBCUTANEOUS EVERY 24 HOURS
Refills: 0 | Status: DISCONTINUED | OUTPATIENT
Start: 2022-10-16 | End: 2022-10-17

## 2022-10-16 RX ORDER — POTASSIUM CHLORIDE 20 MEQ
40 PACKET (EA) ORAL EVERY 4 HOURS
Refills: 0 | Status: COMPLETED | OUTPATIENT
Start: 2022-10-16 | End: 2022-10-16

## 2022-10-16 RX ORDER — METOPROLOL TARTRATE 50 MG
5 TABLET ORAL ONCE
Refills: 0 | Status: COMPLETED | OUTPATIENT
Start: 2022-10-16 | End: 2022-10-16

## 2022-10-16 RX ORDER — PANTOPRAZOLE SODIUM 20 MG/1
40 TABLET, DELAYED RELEASE ORAL DAILY
Refills: 0 | Status: DISCONTINUED | OUTPATIENT
Start: 2022-10-17 | End: 2022-10-17

## 2022-10-16 RX ORDER — LISINOPRIL 2.5 MG/1
40 TABLET ORAL DAILY
Refills: 0 | Status: DISCONTINUED | OUTPATIENT
Start: 2022-10-16 | End: 2022-10-17

## 2022-10-16 RX ADMIN — Medication 975 MILLIGRAM(S): at 13:00

## 2022-10-16 RX ADMIN — Medication 975 MILLIGRAM(S): at 00:17

## 2022-10-16 RX ADMIN — SODIUM CHLORIDE 3 MILLILITER(S): 9 INJECTION INTRAMUSCULAR; INTRAVENOUS; SUBCUTANEOUS at 22:09

## 2022-10-16 RX ADMIN — SODIUM CHLORIDE 3 MILLILITER(S): 9 INJECTION INTRAMUSCULAR; INTRAVENOUS; SUBCUTANEOUS at 06:41

## 2022-10-16 RX ADMIN — Medication 5 MILLIGRAM(S): at 00:22

## 2022-10-16 RX ADMIN — HEPARIN SODIUM 5000 UNIT(S): 5000 INJECTION INTRAVENOUS; SUBCUTANEOUS at 00:22

## 2022-10-16 RX ADMIN — Medication 975 MILLIGRAM(S): at 23:17

## 2022-10-16 RX ADMIN — Medication 600 MILLIGRAM(S): at 23:16

## 2022-10-16 RX ADMIN — Medication 600 MILLIGRAM(S): at 00:17

## 2022-10-16 RX ADMIN — Medication 600 MILLIGRAM(S): at 01:17

## 2022-10-16 RX ADMIN — Medication 975 MILLIGRAM(S): at 12:12

## 2022-10-16 RX ADMIN — Medication 975 MILLIGRAM(S): at 05:39

## 2022-10-16 RX ADMIN — Medication 600 MILLIGRAM(S): at 05:40

## 2022-10-16 RX ADMIN — Medication 5 MILLIGRAM(S): at 23:21

## 2022-10-16 RX ADMIN — Medication 600 MILLIGRAM(S): at 13:00

## 2022-10-16 RX ADMIN — PANTOPRAZOLE SODIUM 40 MILLIGRAM(S): 20 TABLET, DELAYED RELEASE ORAL at 12:12

## 2022-10-16 RX ADMIN — SIMETHICONE 80 MILLIGRAM(S): 80 TABLET, CHEWABLE ORAL at 00:18

## 2022-10-16 RX ADMIN — Medication 40 MILLIEQUIVALENT(S): at 09:51

## 2022-10-16 RX ADMIN — ENOXAPARIN SODIUM 40 MILLIGRAM(S): 100 INJECTION SUBCUTANEOUS at 09:50

## 2022-10-16 RX ADMIN — SODIUM CHLORIDE 3 MILLILITER(S): 9 INJECTION INTRAMUSCULAR; INTRAVENOUS; SUBCUTANEOUS at 14:48

## 2022-10-16 RX ADMIN — Medication 600 MILLIGRAM(S): at 12:13

## 2022-10-16 RX ADMIN — Medication 5 MILLIGRAM(S): at 05:39

## 2022-10-16 RX ADMIN — Medication 40 MILLIEQUIVALENT(S): at 14:26

## 2022-10-16 RX ADMIN — Medication 975 MILLIGRAM(S): at 01:17

## 2022-10-16 RX ADMIN — LISINOPRIL 40 MILLIGRAM(S): 2.5 TABLET ORAL at 09:51

## 2022-10-16 RX ADMIN — Medication 100 MILLIGRAM(S): at 23:17

## 2022-10-16 RX ADMIN — Medication 1: at 12:19

## 2022-10-16 NOTE — PROGRESS NOTE ADULT - ASSESSMENT
A/P: 37y POD#2 s/p DINO BSO for AUB and R adnexal mass, recovering well post-operatively.     Neuro: PO Tylenol, Motrin, Oxy PRN.  CV: f/u AM CBC. Hemodynamically stable.  Pulm: Saturating well on RA. Increase incentive spirometry.  GI: Consistent carb regular diet.  : f/u AM BMP. Voiding spontaneously, urine output adequate.  Heme: Continue HSQ for DVT ppx. Increase OOB.    ID: Afebrile  Endo: c/w ISS  Dispo: Continue inpatient post-operative care.    Milana Guerrero  PGY-2

## 2022-10-16 NOTE — PROGRESS NOTE ADULT - NSPROGADDITIONALINFOA_GEN_ALL_CORE
Seen on rounds with CHELLY MOTA. Labs and flow reviewed. Nanda PO. Ambul. Dispo disc. All Q/A.
Seen on rounds with F, R. Labs and flow reviewed. Agree with plan. All Q/A, incl with family.

## 2022-10-16 NOTE — PROGRESS NOTE ADULT - SUBJECTIVE AND OBJECTIVE BOX
R2 Gyn ONC Progress Note  POD#2  HD#3    Subjective:   Pt seen and examined at bedside. No events overnight. Pain well controlled. Patient ambulating to bathroom. Passing flatus. Tolerating regular diet - had some of a [employer] yesterday and had some chicken. Pt denies fever, chills, chest pain, SOB, nausea, vomiting, lightheadedness, dizziness.      Objective:  T(F): 98.1 (10-16-22 @ 01:48), Max: 98.6 (10-15-22 @ 06:01)  HR: 77 (10-16-22 @ 01:48) (73 - 84)  BP: 155/71 (10-16-22 @ 01:48) (121/61 - 162/88)  RR: 16 (10-16-22 @ 01:48) (16 - 18)  SpO2: 97% (10-16-22 @ 01:48) (97% - 100%)  Wt(kg): --  I&O's Summary    14 Oct 2022 07:01  -  15 Oct 2022 07:00  --------------------------------------------------------  IN: 1650 mL / OUT: 895 mL / NET: 755 mL    15 Oct 2022 07:01  -  16 Oct 2022 05:29  --------------------------------------------------------  IN: 1835 mL / OUT: 2950 mL / NET: -1115 mL      CAPILLARY BLOOD GLUCOSE      POCT Blood Glucose.: 200 mg/dL (15 Oct 2022 22:45)  POCT Blood Glucose.: 122 mg/dL (15 Oct 2022 17:16)  POCT Blood Glucose.: 111 mg/dL (15 Oct 2022 12:00)  POCT Blood Glucose.: 114 mg/dL (15 Oct 2022 08:14)      MEDICATIONS  (STANDING):  acetaminophen     Tablet .. 975 milliGRAM(s) Oral every 6 hours  acetaminophen   IVPB .. 1000 milliGRAM(s) IV Intermittent once  dextrose 5%. 1000 milliLiter(s) (50 mL/Hr) IV Continuous <Continuous>  dextrose 5%. 1000 milliLiter(s) (100 mL/Hr) IV Continuous <Continuous>  dextrose 50% Injectable 25 Gram(s) IV Push once  dextrose 50% Injectable 12.5 Gram(s) IV Push once  dextrose 50% Injectable 25 Gram(s) IV Push once  glucagon  Injectable 1 milliGRAM(s) IntraMuscular once  heparin   Injectable 5000 Unit(s) SubCutaneous every 8 hours  ibuprofen  Tablet. 600 milliGRAM(s) Oral every 6 hours  insulin lispro (ADMELOG) corrective regimen sliding scale   SubCutaneous three times a day before meals  lactated ringers. 1000 milliLiter(s) (125 mL/Hr) IV Continuous <Continuous>  metoprolol tartrate Injectable 5 milliGRAM(s) IV Push every 6 hours  pantoprazole  Injectable 40 milliGRAM(s) IV Push daily  sodium chloride 0.9% lock flush 3 milliLiter(s) IV Push every 8 hours    MEDICATIONS  (PRN):  dextrose Oral Gel 15 Gram(s) Oral once PRN Blood Glucose LESS THAN 70 milliGRAM(s)/deciliter  ondansetron Injectable 4 milliGRAM(s) IV Push every 6 hours PRN Nausea  oxyCODONE    IR 5 milliGRAM(s) Oral every 8 hours PRN Severe Pain (7 - 10)  senna 1 Tablet(s) Oral at bedtime PRN Constipation  simethicone 80 milliGRAM(s) Chew every 6 hours PRN Gas      Physical Exam:  Constitutional: NAD, A+O x3  CV: RR S1S2 no m/r/g  Lungs: CTA b/l, good air flow b/l  Abdomen: soft, softly-distended, appropriately-tender. no guarding, no rebound, normal bowel sounds  Incision: bandage over incision clean, dry, intact  Extremities: no lower extremity edema or calf tenderness bilaterally; venodynes in place    LABS:               9.0    10.62 )-----------( 247      ( 10-15 @ 05:49 )             31.7       10-15    139    |  105    |  7      ----------------------------<  103<H>  3.8     |  23     |  0.64     Ca    8.9        15 Oct 2022 05:49  Phos  3.6       10-15  Mg     1.70      10-15

## 2022-10-17 ENCOUNTER — TRANSCRIPTION ENCOUNTER (OUTPATIENT)
Age: 37
End: 2022-10-17

## 2022-10-17 VITALS
SYSTOLIC BLOOD PRESSURE: 149 MMHG | TEMPERATURE: 98 F | OXYGEN SATURATION: 100 % | HEART RATE: 82 BPM | RESPIRATION RATE: 16 BRPM | DIASTOLIC BLOOD PRESSURE: 81 MMHG

## 2022-10-17 LAB
ANION GAP SERPL CALC-SCNC: 11 MMOL/L — SIGNIFICANT CHANGE UP (ref 7–14)
BASOPHILS # BLD AUTO: 0.02 K/UL — SIGNIFICANT CHANGE UP (ref 0–0.2)
BASOPHILS NFR BLD AUTO: 0.3 % — SIGNIFICANT CHANGE UP (ref 0–2)
BLD GP AB SCN SERPL QL: NEGATIVE — SIGNIFICANT CHANGE UP
BUN SERPL-MCNC: 8 MG/DL — SIGNIFICANT CHANGE UP (ref 7–23)
CALCIUM SERPL-MCNC: 9 MG/DL — SIGNIFICANT CHANGE UP (ref 8.4–10.5)
CHLORIDE SERPL-SCNC: 104 MMOL/L — SIGNIFICANT CHANGE UP (ref 98–107)
CO2 SERPL-SCNC: 23 MMOL/L — SIGNIFICANT CHANGE UP (ref 22–31)
CREAT SERPL-MCNC: 0.62 MG/DL — SIGNIFICANT CHANGE UP (ref 0.5–1.3)
EGFR: 116 ML/MIN/1.73M2 — SIGNIFICANT CHANGE UP
EOSINOPHIL # BLD AUTO: 0.21 K/UL — SIGNIFICANT CHANGE UP (ref 0–0.5)
EOSINOPHIL NFR BLD AUTO: 2.7 % — SIGNIFICANT CHANGE UP (ref 0–6)
GLUCOSE BLDC GLUCOMTR-MCNC: 117 MG/DL — HIGH (ref 70–99)
GLUCOSE BLDC GLUCOMTR-MCNC: 185 MG/DL — HIGH (ref 70–99)
GLUCOSE SERPL-MCNC: 106 MG/DL — HIGH (ref 70–99)
HCT VFR BLD CALC: 33.1 % — LOW (ref 34.5–45)
HGB BLD-MCNC: 9.3 G/DL — LOW (ref 11.5–15.5)
IANC: 4.09 K/UL — SIGNIFICANT CHANGE UP (ref 1.8–7.4)
IMM GRANULOCYTES NFR BLD AUTO: 0.4 % — SIGNIFICANT CHANGE UP (ref 0–0.9)
LYMPHOCYTES # BLD AUTO: 3.17 K/UL — SIGNIFICANT CHANGE UP (ref 1–3.3)
LYMPHOCYTES # BLD AUTO: 40.6 % — SIGNIFICANT CHANGE UP (ref 13–44)
MCHC RBC-ENTMCNC: 21 PG — LOW (ref 27–34)
MCHC RBC-ENTMCNC: 28.1 GM/DL — LOW (ref 32–36)
MCV RBC AUTO: 74.9 FL — LOW (ref 80–100)
MONOCYTES # BLD AUTO: 0.29 K/UL — SIGNIFICANT CHANGE UP (ref 0–0.9)
MONOCYTES NFR BLD AUTO: 3.7 % — SIGNIFICANT CHANGE UP (ref 2–14)
NEUTROPHILS # BLD AUTO: 4.09 K/UL — SIGNIFICANT CHANGE UP (ref 1.8–7.4)
NEUTROPHILS NFR BLD AUTO: 52.3 % — SIGNIFICANT CHANGE UP (ref 43–77)
NON-GYNECOLOGICAL CYTOLOGY STUDY: SIGNIFICANT CHANGE UP
NRBC # BLD: 0 /100 WBCS — SIGNIFICANT CHANGE UP (ref 0–0)
NRBC # FLD: 0 K/UL — SIGNIFICANT CHANGE UP (ref 0–0)
PLATELET # BLD AUTO: 255 K/UL — SIGNIFICANT CHANGE UP (ref 150–400)
POTASSIUM SERPL-MCNC: 3.9 MMOL/L — SIGNIFICANT CHANGE UP (ref 3.5–5.3)
POTASSIUM SERPL-SCNC: 3.9 MMOL/L — SIGNIFICANT CHANGE UP (ref 3.5–5.3)
RBC # BLD: 4.42 M/UL — SIGNIFICANT CHANGE UP (ref 3.8–5.2)
RBC # FLD: 15.3 % — HIGH (ref 10.3–14.5)
RH IG SCN BLD-IMP: POSITIVE — SIGNIFICANT CHANGE UP
SODIUM SERPL-SCNC: 138 MMOL/L — SIGNIFICANT CHANGE UP (ref 135–145)
WBC # BLD: 7.81 K/UL — SIGNIFICANT CHANGE UP (ref 3.8–10.5)
WBC # FLD AUTO: 7.81 K/UL — SIGNIFICANT CHANGE UP (ref 3.8–10.5)

## 2022-10-17 RX ORDER — IBUPROFEN 200 MG
1 TABLET ORAL
Qty: 0 | Refills: 0 | DISCHARGE
Start: 2022-10-17

## 2022-10-17 RX ORDER — OXYCODONE HYDROCHLORIDE 5 MG/1
1 TABLET ORAL
Qty: 6 | Refills: 0
Start: 2022-10-17

## 2022-10-17 RX ORDER — ACETAMINOPHEN 500 MG
3 TABLET ORAL
Qty: 0 | Refills: 0 | DISCHARGE
Start: 2022-10-17

## 2022-10-17 RX ADMIN — ENOXAPARIN SODIUM 40 MILLIGRAM(S): 100 INJECTION SUBCUTANEOUS at 08:57

## 2022-10-17 RX ADMIN — SODIUM CHLORIDE 3 MILLILITER(S): 9 INJECTION INTRAMUSCULAR; INTRAVENOUS; SUBCUTANEOUS at 06:41

## 2022-10-17 RX ADMIN — Medication 600 MILLIGRAM(S): at 00:17

## 2022-10-17 RX ADMIN — Medication 975 MILLIGRAM(S): at 05:46

## 2022-10-17 RX ADMIN — Medication 1: at 12:05

## 2022-10-17 RX ADMIN — Medication 975 MILLIGRAM(S): at 00:17

## 2022-10-17 RX ADMIN — LISINOPRIL 40 MILLIGRAM(S): 2.5 TABLET ORAL at 05:46

## 2022-10-17 RX ADMIN — Medication 600 MILLIGRAM(S): at 05:46

## 2022-10-17 NOTE — PROGRESS NOTE ADULT - ASSESSMENT
A/P: 37y POD#3 s/p DINO BSO for AUB and R adnexal mass, recovering well post-operatively.     Neuro: PO Tylenol, Motrin, Oxy PRN.  CV: f/u AM CBC. Patient with 1 elevated BP to 170/85 overnight s/p 5mg Lopressor with subsequent BPs 140-150s/80s. Hemodynamically stable.  Pulm: Saturating well on RA. Increase incentive spirometry.  GI: Consistent carb regular diet.  : f/u AM BMP. Voiding spontaneously, urine output adequate.  Heme: Continue HSQ for DVT ppx. Increase OOB.    ID: Afebrile  Endo: c/w ISS  Dispo: Continue inpatient post-operative care, discharge planning    Cha Knight, PGY-1 A/P: 37y POD#3 s/p DINO BSO for AUB and R adnexal mass, recovering well post-operatively.     Neuro: PO Tylenol, Motrin, Oxy PRN.  CV: f/u AM CBC. Patient with 1 elevated BP to 170/85 overnight s/p 5mg Lopressor with subsequent BPs 140-150s/80s. Hemodynamically stable.  Pulm: Saturating well on RA. Increase incentive spirometry.  GI: Consistent carb regular diet.  : f/u AM BMP. Voiding spontaneously, urine output adequate.  Heme: Continue HSQ for DVT ppx. Increase OOB.    ID: Afebrile  Endo: T2DM, FS overnight 105-132, c/w ISS  Dispo: Continue inpatient post-operative care, discharge planning    Cha Knight, PGY-1 A/P: 37y POD#3 s/p DINO BSO for AUB and R adnexal mass, recovering well post-operatively and meeting all post op milestones.      Neuro: PO Tylenol, Motrin, Oxy PRN.  CV: f/u AM CBC. Patient with 1 elevated BP to 170/85 overnight s/p 5mg Lopressor with subsequent BPs 140-150s/80s. Currently hemodynamically stable. C/w home Lisinopril (restarted yesterday).  Pulm: Saturating well on RA. Increase incentive spirometry.  GI: Consistent carb regular diet. C/w Protonix, Simethicone, Zofran PRN.   : f/u AM BMP. Voiding spontaneously, urine output adequate.  Heme: Continue Lovenox for DVT ppx. Increase OOB.    ID: Afebrile  Endo: Hx T2DM, FS overnight 105-132, c/w ISS  Dispo: Continue inpatient post-operative care, discharge planning.    Cha Knight, PGY-1

## 2022-10-17 NOTE — DISCHARGE NOTE NURSING/CASE MANAGEMENT/SOCIAL WORK - NSDCPECAREGIVERED_GEN_ALL_CORE
pt ambulating, eating, voiding without difficulty. iv discontinued. no distress noted. patient has abdomen binder, combine in place without sign of infection to midline incision. patient given supplies./No

## 2022-10-17 NOTE — PROGRESS NOTE ADULT - SUBJECTIVE AND OBJECTIVE BOX
R1 Gyn ONC Progress Note POD#3  HD#4    Subjective:   Pt seen and examined at bedside. Overnight, patient with elevated BP to 170/85 and was given lopressor 5mg. Pain well controlled. Patient ambulating. Passing flatus. Tolerating regular diet. Pt denies fever, chills, chest pain, SOB, nausea, vomiting, lightheadedness, dizziness.      Objective:  T(F): 98.4 (10-17-22 @ 05:49), Max: 99.2 (10-16-22 @ 22:38)  HR: 76 (10-17-22 @ 05:49) (73 - 86)  BP: 156/89 (10-17-22 @ 05:49) (116/73 - 170/85)  RR: 18 (10-17-22 @ 05:49) (17 - 18)  SpO2: 100% (10-17-22 @ 05:49) (98% - 100%)  Wt(kg): --  I&O's Summary    15 Oct 2022 07:01  -  16 Oct 2022 07:00  --------------------------------------------------------  IN: 1835 mL / OUT: 2950 mL / NET: -1115 mL    16 Oct 2022 07:01  -  17 Oct 2022 06:14  --------------------------------------------------------  IN: 1450 mL / OUT: 1100 mL / NET: 350 mL      CAPILLARY BLOOD GLUCOSE      POCT Blood Glucose.: 105 mg/dL (16 Oct 2022 22:04)  POCT Blood Glucose.: 132 mg/dL (16 Oct 2022 17:09)  POCT Blood Glucose.: 155 mg/dL (16 Oct 2022 12:02)  POCT Blood Glucose.: 122 mg/dL (16 Oct 2022 08:10)      MEDICATIONS  (STANDING):  acetaminophen     Tablet .. 975 milliGRAM(s) Oral every 6 hours  acetaminophen   IVPB .. 1000 milliGRAM(s) IV Intermittent once  dextrose 5%. 1000 milliLiter(s) (50 mL/Hr) IV Continuous <Continuous>  dextrose 5%. 1000 milliLiter(s) (100 mL/Hr) IV Continuous <Continuous>  dextrose 50% Injectable 25 Gram(s) IV Push once  dextrose 50% Injectable 12.5 Gram(s) IV Push once  dextrose 50% Injectable 25 Gram(s) IV Push once  enoxaparin Injectable 40 milliGRAM(s) SubCutaneous every 24 hours  glucagon  Injectable 1 milliGRAM(s) IntraMuscular once  ibuprofen  Tablet. 600 milliGRAM(s) Oral every 6 hours  insulin lispro (ADMELOG) corrective regimen sliding scale   SubCutaneous three times a day before meals  lisinopril 40 milliGRAM(s) Oral daily  pantoprazole    Tablet 40 milliGRAM(s) Oral daily  sodium chloride 0.9% lock flush 3 milliLiter(s) IV Push every 8 hours    MEDICATIONS  (PRN):  benzonatate 100 milliGRAM(s) Oral every 8 hours PRN Cough  dextrose Oral Gel 15 Gram(s) Oral once PRN Blood Glucose LESS THAN 70 milliGRAM(s)/deciliter  ondansetron Injectable 4 milliGRAM(s) IV Push every 6 hours PRN Nausea  oxyCODONE    IR 5 milliGRAM(s) Oral every 8 hours PRN Severe Pain (7 - 10)  senna 1 Tablet(s) Oral at bedtime PRN Constipation  simethicone 80 milliGRAM(s) Chew every 6 hours PRN Gas      Physical Exam:  Constitutional: NAD, A+O x3  CV: RR S1S2 no m/r/g  Lungs: CTA b/l, good air flow b/l  Abdomen: soft, softly-distended, appropriately-tender. no guarding, no rebound, normal bowel sounds  Incision: bandage over incision clean, dry, intact  Extremities: no lower extremity edema or calf tenderness bilaterally; venodynes in place    LABS:               9.1    9.10  )-----------( 241      ( 10-16 @ 05:35 )             31.2                9.0    10.62 )-----------( 247      ( 10-15 @ 05:49 )             31.7       10-16    140    |  106    |  8      ----------------------------<  117<H>  3.7     |  24     |  0.55     Ca    8.9        16 Oct 2022 05:35 R1 Gyn ONC Progress Note POD#3  HD#4    Subjective:   Pt seen and examined at bedside. Overnight, patient with elevated BP to 170/85 and was given lopressor 5mg. Pain well controlled. Patient ambulating. Passing flatus. Tolerating regular diet. Pt denies fever, chills, HA, chest pain, SOB, nausea, vomiting, lightheadedness, dizziness.      Objective:  T(F): 98.4 (10-17-22 @ 05:49), Max: 99.2 (10-16-22 @ 22:38)  HR: 76 (10-17-22 @ 05:49) (73 - 86)  BP: 156/89 (10-17-22 @ 05:49) (116/73 - 170/85)  RR: 18 (10-17-22 @ 05:49) (17 - 18)  SpO2: 100% (10-17-22 @ 05:49) (98% - 100%)  Wt(kg): --  I&O's Summary    15 Oct 2022 07:01  -  16 Oct 2022 07:00  --------------------------------------------------------  IN: 1835 mL / OUT: 2950 mL / NET: -1115 mL    16 Oct 2022 07:01  -  17 Oct 2022 06:14  --------------------------------------------------------  IN: 1450 mL / OUT: 1100 mL / NET: 350 mL      CAPILLARY BLOOD GLUCOSE      POCT Blood Glucose.: 105 mg/dL (16 Oct 2022 22:04)  POCT Blood Glucose.: 132 mg/dL (16 Oct 2022 17:09)  POCT Blood Glucose.: 155 mg/dL (16 Oct 2022 12:02)  POCT Blood Glucose.: 122 mg/dL (16 Oct 2022 08:10)      MEDICATIONS  (STANDING):  acetaminophen     Tablet .. 975 milliGRAM(s) Oral every 6 hours  acetaminophen   IVPB .. 1000 milliGRAM(s) IV Intermittent once  dextrose 5%. 1000 milliLiter(s) (50 mL/Hr) IV Continuous <Continuous>  dextrose 5%. 1000 milliLiter(s) (100 mL/Hr) IV Continuous <Continuous>  dextrose 50% Injectable 25 Gram(s) IV Push once  dextrose 50% Injectable 12.5 Gram(s) IV Push once  dextrose 50% Injectable 25 Gram(s) IV Push once  enoxaparin Injectable 40 milliGRAM(s) SubCutaneous every 24 hours  glucagon  Injectable 1 milliGRAM(s) IntraMuscular once  ibuprofen  Tablet. 600 milliGRAM(s) Oral every 6 hours  insulin lispro (ADMELOG) corrective regimen sliding scale   SubCutaneous three times a day before meals  lisinopril 40 milliGRAM(s) Oral daily  pantoprazole    Tablet 40 milliGRAM(s) Oral daily  sodium chloride 0.9% lock flush 3 milliLiter(s) IV Push every 8 hours    MEDICATIONS  (PRN):  benzonatate 100 milliGRAM(s) Oral every 8 hours PRN Cough  dextrose Oral Gel 15 Gram(s) Oral once PRN Blood Glucose LESS THAN 70 milliGRAM(s)/deciliter  ondansetron Injectable 4 milliGRAM(s) IV Push every 6 hours PRN Nausea  oxyCODONE    IR 5 milliGRAM(s) Oral every 8 hours PRN Severe Pain (7 - 10)  senna 1 Tablet(s) Oral at bedtime PRN Constipation  simethicone 80 milliGRAM(s) Chew every 6 hours PRN Gas      Physical Exam:  Constitutional: NAD, A+O x3  CV: RR S1S2 no m/r/g  Lungs: CTA b/l, good air flow b/l  Abdomen: soft, softly-distended, appropriately-tender. no guarding, no rebound, normal bowel sounds  Incision: bandage over incision clean, dry, intact  Extremities: no lower extremity edema or calf tenderness bilaterally; venodynes in place    LABS:               9.1    9.10  )-----------( 241      ( 10-16 @ 05:35 )             31.2                9.0    10.62 )-----------( 247      ( 10-15 @ 05:49 )             31.7       10-16    140    |  106    |  8      ----------------------------<  117<H>  3.7     |  24     |  0.55     Ca    8.9        16 Oct 2022 05:35 R1 Gyn ONC Progress Note POD#3  HD#4    Subjective:   Pt seen and examined at bedside. Overnight, patient with elevated BP to 170/85 and was given lopressor 5mg. Pain well controlled. Patient ambulating. Passing flatus. Tolerating regular diet. Pt denies fever, chills, HA, chest pain, SOB, nausea, vomiting, lightheadedness, dizziness.      Objective:  T(F): 98.4 (10-17-22 @ 05:49), Max: 99.2 (10-16-22 @ 22:38)  HR: 76 (10-17-22 @ 05:49) (73 - 86)  BP: 156/89 (10-17-22 @ 05:49) (116/73 - 170/85)  RR: 18 (10-17-22 @ 05:49) (17 - 18)  SpO2: 100% (10-17-22 @ 05:49) (98% - 100%)  Wt(kg): --  I&O's Summary    15 Oct 2022 07:01  -  16 Oct 2022 07:00  --------------------------------------------------------  IN: 1835 mL / OUT: 2950 mL / NET: -1115 mL    16 Oct 2022 07:01  -  17 Oct 2022 06:14  --------------------------------------------------------  IN: 1450 mL / OUT: 1100 mL / NET: 350 mL      CAPILLARY BLOOD GLUCOSE      POCT Blood Glucose.: 105 mg/dL (16 Oct 2022 22:04)  POCT Blood Glucose.: 132 mg/dL (16 Oct 2022 17:09)  POCT Blood Glucose.: 155 mg/dL (16 Oct 2022 12:02)  POCT Blood Glucose.: 122 mg/dL (16 Oct 2022 08:10)      MEDICATIONS  (STANDING):  acetaminophen     Tablet .. 975 milliGRAM(s) Oral every 6 hours  acetaminophen   IVPB .. 1000 milliGRAM(s) IV Intermittent once  dextrose 5%. 1000 milliLiter(s) (50 mL/Hr) IV Continuous <Continuous>  dextrose 5%. 1000 milliLiter(s) (100 mL/Hr) IV Continuous <Continuous>  dextrose 50% Injectable 25 Gram(s) IV Push once  dextrose 50% Injectable 12.5 Gram(s) IV Push once  dextrose 50% Injectable 25 Gram(s) IV Push once  enoxaparin Injectable 40 milliGRAM(s) SubCutaneous every 24 hours  glucagon  Injectable 1 milliGRAM(s) IntraMuscular once  ibuprofen  Tablet. 600 milliGRAM(s) Oral every 6 hours  insulin lispro (ADMELOG) corrective regimen sliding scale   SubCutaneous three times a day before meals  lisinopril 40 milliGRAM(s) Oral daily  pantoprazole    Tablet 40 milliGRAM(s) Oral daily  sodium chloride 0.9% lock flush 3 milliLiter(s) IV Push every 8 hours    MEDICATIONS  (PRN):  benzonatate 100 milliGRAM(s) Oral every 8 hours PRN Cough  dextrose Oral Gel 15 Gram(s) Oral once PRN Blood Glucose LESS THAN 70 milliGRAM(s)/deciliter  ondansetron Injectable 4 milliGRAM(s) IV Push every 6 hours PRN Nausea  oxyCODONE    IR 5 milliGRAM(s) Oral every 8 hours PRN Severe Pain (7 - 10)  senna 1 Tablet(s) Oral at bedtime PRN Constipation  simethicone 80 milliGRAM(s) Chew every 6 hours PRN Gas      Physical Exam:  Constitutional: NAD, A+O x3  CV: RR S1S2 no m/r/g  Lungs: CTA b/l, good air flow b/l  Abdomen: soft, softly-distended, appropriately-tender. no guarding, no rebound, normal bowel sounds  Incision: midline vertical incision clean, dry, intact with abdominal binder in place  Extremities: no lower extremity edema or calf tenderness bilaterally; venodynes in place    LABS:               9.1    9.10  )-----------( 241      ( 10-16 @ 05:35 )             31.2                9.0    10.62 )-----------( 247      ( 10-15 @ 05:49 )             31.7       10-16    140    |  106    |  8      ----------------------------<  117<H>  3.7     |  24     |  0.55     Ca    8.9        16 Oct 2022 05:35

## 2022-10-17 NOTE — DISCHARGE NOTE NURSING/CASE MANAGEMENT/SOCIAL WORK - PATIENT PORTAL LINK FT
You can access the FollowMyHealth Patient Portal offered by [facility] by registering at the following website: [email or web address]. By joining [facility]’s FollowMyHealth portal, you will also be able to view your health information using other applications (apps) compatible with our system.

## 2022-10-17 NOTE — DISCHARGE NOTE NURSING/CASE MANAGEMENT/SOCIAL WORK - NSDCPNINST_GEN_ALL_CORE
call md for sign of infection (temp greater than 100.4f, redness at incision, pain not relieved by meds). call md for follow up appt. drink 9-13 eight oz. glasses of fluid daily.

## 2022-10-17 NOTE — PROGRESS NOTE ADULT - PROBLEM SELECTOR PLAN 1
GYN ONC Fellow Addendum:    Pt seen and examined at bedside. Agree with above. Pain well controlled. Tolerating PO. +flatus, no BM yet. Has been OOB    VS reviewed  Labs reviewed    Continue current pain regimen  Tolerating reg diet  Encourage ambulation and IS use  Replete lytes prn  DVT ppx: Transition to Lovenox  Dispo: cont inpt post op care    Orly Love MD
GYN ONC Fellow Addendum:    Pt seen and examined at bedside. Agree with above. Pain well controlled this AM, episode of nausea/emesis last evening just after transfer to floor from PACU since then no nausea and tolerating CLD.    VS reviewed  Labs reviewed    Continue current pain regimen  D/c barber TOMARLENE  ADAT, ARBF  Encourage ambulation and IS use  Replete lytes prn  DVT ppx: SQH  Dispo: cont inpt care    Orly Love MD
GYN ONC Fellow Addendum:    Pt seen and examined at bedside. Agree with above. Pain well controlled.  Tolerating PO. +flatus. Has been OOB.    VS reviewed  Labs reviewed    Continue current pain regimen  Tolerating reg diet  Encourage ambulation and IS use  Replete lytes prn  DVT ppx: Lovenox  Dispo: d/c home today    Orly Love MD

## 2022-10-17 NOTE — PROGRESS NOTE ADULT - PROBLEM SELECTOR PROBLEM 1
Pt was evaluated on 9/10/2018 and was seen 9 times for PT. Pt has not attended PT since 10/9/2018. Pt was given HEP. Current status is unknown. Pt to be d/c'd at this time.    
Adnexal mass

## 2022-10-17 NOTE — PROGRESS NOTE ADULT - ATTENDING COMMENTS
Patient seen and examined on am rounds with team.   Doing well.   Stable for discharge home.   Instructions reviewed.

## 2022-10-20 LAB — SURGICAL PATHOLOGY STUDY: SIGNIFICANT CHANGE UP

## 2022-11-02 ENCOUNTER — APPOINTMENT (OUTPATIENT)
Dept: OBGYN | Facility: HOSPITAL | Age: 37
End: 2022-11-02

## 2022-11-02 ENCOUNTER — APPOINTMENT (OUTPATIENT)
Dept: GYNECOLOGIC ONCOLOGY | Facility: HOSPITAL | Age: 37
End: 2022-11-02

## 2022-11-02 ENCOUNTER — OUTPATIENT (OUTPATIENT)
Dept: OUTPATIENT SERVICES | Facility: HOSPITAL | Age: 37
LOS: 1 days | End: 2022-11-02

## 2022-11-02 VITALS
SYSTOLIC BLOOD PRESSURE: 134 MMHG | HEART RATE: 95 BPM | HEIGHT: 61 IN | WEIGHT: 162 LBS | BODY MASS INDEX: 30.58 KG/M2 | TEMPERATURE: 97.7 F | DIASTOLIC BLOOD PRESSURE: 80 MMHG

## 2022-11-02 DIAGNOSIS — Z98.890 OTHER SPECIFIED POSTPROCEDURAL STATES: Chronic | ICD-10-CM

## 2022-11-02 DIAGNOSIS — Z90.721 ACQUIRED ABSENCE OF OVARIES, UNILATERAL: Chronic | ICD-10-CM

## 2022-11-02 PROCEDURE — 99024 POSTOP FOLLOW-UP VISIT: CPT | Mod: GC

## 2022-11-02 NOTE — DISCUSSION/SUMMARY
[Clean] : was clean [Dry] : was dry [Intact] : was intact [None] : had no drainage [Normal Skin] : normal appearance [Doing Well] : is doing well [1] : 1 [Diminshed] : diminished [Erythema] : was not erythematous [Ecchymosis] : was not ecchymotic [Seroma] : had no seroma [Firm] : soft [Tender] : nontender [Rebound] : no rebound tenderness [Guarding] : no guarding [Mass] : no palpable mass [de-identified] : vaginal cuff check

## 2022-11-03 ENCOUNTER — OUTPATIENT (OUTPATIENT)
Dept: OUTPATIENT SERVICES | Facility: HOSPITAL | Age: 37
LOS: 1 days | End: 2022-11-03

## 2022-11-03 ENCOUNTER — APPOINTMENT (OUTPATIENT)
Dept: INTERNAL MEDICINE | Facility: CLINIC | Age: 37
End: 2022-11-03

## 2022-11-03 VITALS
HEIGHT: 61 IN | WEIGHT: 159 LBS | SYSTOLIC BLOOD PRESSURE: 114 MMHG | HEART RATE: 90 BPM | OXYGEN SATURATION: 99 % | BODY MASS INDEX: 30.02 KG/M2 | DIASTOLIC BLOOD PRESSURE: 80 MMHG | TEMPERATURE: 98 F

## 2022-11-03 DIAGNOSIS — R21 RASH AND OTHER NONSPECIFIC SKIN ERUPTION: ICD-10-CM

## 2022-11-03 DIAGNOSIS — Z98.890 OTHER SPECIFIED POSTPROCEDURAL STATES: Chronic | ICD-10-CM

## 2022-11-03 DIAGNOSIS — D27.9 BENIGN NEOPLASM OF UNSPECIFIED OVARY: ICD-10-CM

## 2022-11-03 DIAGNOSIS — D25.9 LEIOMYOMA OF UTERUS, UNSPECIFIED: ICD-10-CM

## 2022-11-03 DIAGNOSIS — Z90.721 ACQUIRED ABSENCE OF OVARIES, UNILATERAL: Chronic | ICD-10-CM

## 2022-11-03 PROCEDURE — ZZZZZ: CPT

## 2022-11-03 RX ORDER — DOCUSATE SODIUM 100 MG/1
100 CAPSULE ORAL
Refills: 0 | Status: DISCONTINUED | COMMUNITY
Start: 2022-09-13 | End: 2022-11-03

## 2022-11-03 RX ORDER — BLOOD-GLUCOSE METER
W/DEVICE KIT MISCELLANEOUS
Qty: 1 | Refills: 0 | Status: ACTIVE | COMMUNITY
Start: 2022-11-03 | End: 1900-01-01

## 2022-11-03 RX ORDER — SYRING-NEEDL,DISP,INSUL,0.3 ML 30 GX5/16"
SYRINGE, EMPTY DISPOSABLE MISCELLANEOUS
Qty: 1 | Refills: 0 | Status: ACTIVE | COMMUNITY
Start: 2022-11-03 | End: 1900-01-01

## 2022-11-03 RX ORDER — LANCETS 33 GAUGE
EACH MISCELLANEOUS
Qty: 1 | Refills: 5 | Status: ACTIVE | COMMUNITY
Start: 2022-11-03 | End: 1900-01-01

## 2022-11-03 RX ORDER — LISINOPRIL 40 MG/1
40 TABLET ORAL DAILY
Qty: 30 | Refills: 3 | Status: ACTIVE | COMMUNITY
Start: 2022-09-21 | End: 1900-01-01

## 2022-11-03 RX ORDER — BLOOD PRESSURE TEST KIT-MEDIUM
KIT MISCELLANEOUS
Qty: 1 | Refills: 0 | Status: ACTIVE | COMMUNITY
Start: 2022-11-03 | End: 1900-01-01

## 2022-11-03 NOTE — PHYSICAL EXAM
[No Acute Distress] : no acute distress [Supple] : supple [No Respiratory Distress] : no respiratory distress  [No Accessory Muscle Use] : no accessory muscle use [Clear to Auscultation] : lungs were clear to auscultation bilaterally [Normal Rate] : normal rate  [Regular Rhythm] : with a regular rhythm [Normal S1, S2] : normal S1 and S2 [Soft] : abdomen soft [Non Tender] : non-tender [Non-distended] : non-distended [Normal Bowel Sounds] : normal bowel sounds [de-identified] : Pt has abd binder  [de-identified] : erythematous rounded lesions of various sizes with scale diffusely present in arms and legs

## 2022-11-03 NOTE — HISTORY OF PRESENT ILLNESS
[Family Member] : family member [FreeTextEntry1] : f/u [de-identified] : \par \par Patient is a 37 year old F with hx of HTN, DM2, R adnexal mass s/p ex lap, DINO R salpingo oophorectomy, coming in for a follow up.\par \par Pt endorses her surgery went okay. Denies any vaginal bleeding, pain, N/V/abd distention. Pt was told pathology was benign. \par \par Patient reports that since her hospitalization her metformin dose was increased to 500mg BID as she was having high blood sugars in the hospital. She does not help her FS as she does not have any supplies.  Pt tries to eat healthy-soup, roti, vegetables. Not exercising yet since her surgery. Has lost wt since she is now eating less. \par \par In terms of her blood pressure, pt now taking lisinopril 40mg twice a day, although was prescribed once a day. Pt does not take BP at home.  Denies any CP, palpitations, leg swelling. \par \par Pt reports itchy rash which developed after her hospital stay. Rash originally started at the L arm and continued to spread to both legs and R arm. Rash is itchy, but not painful. Pt denies any new exposures, no new medications, no allergies. No new pets or soaps. Has been taking oral Benadryl does not know dose- endorses that it helped calm the itching. Rash is still present.

## 2022-11-03 NOTE — PLAN
[FreeTextEntry1] : \par \par Patient is a 37 year old F with hx of HTN, DM2, R adnexal mass s/p ex lap, DINO R salpingo oophorectomy, coming in for a follow up.\par \par #skin rash\par - noted to have rounded skin lesions of different sizes  with scale, present in arms and legs\par - unclear etiology, but possibly nummular dermatitis, less likely urticaria vs drug reaction\par - will check CBC to asses for  eosinophilia \par - will trial high dose  topical steroid for two weeks, can apply to arms to see if it helps, cont with skin hydration in between steroid application, can cont w Benadryl if helps the itching, can try OTC sarna for itching\par - derm referral provided\par - f/u in 2 wks\par \par #HTN\par - has been taking lisinopril BID instead of daily, advised that this is a daily med\par - BP monitor sent, cont to monitor BP twice a day and bring log in 2 wks\par - BMP check\par \par #DM2\par - on metformin 500mg BID\par - glucose monitoring supplies sent\par - advised to cont healthy eating, exercise as tolerated since her recent sx \par - will discuss statin initiation at another time \par \par #anemia\par - pt endorses not taking iron after her sx, denies any bleeding\par - will obtain CBC and iron studies\par - can cont iron tx \par \par f/u in 2 wks \par

## 2022-11-04 ENCOUNTER — NON-APPOINTMENT (OUTPATIENT)
Age: 37
End: 2022-11-04

## 2022-11-04 DIAGNOSIS — R21 RASH AND OTHER NONSPECIFIC SKIN ERUPTION: ICD-10-CM

## 2022-11-04 DIAGNOSIS — D64.9 ANEMIA, UNSPECIFIED: ICD-10-CM

## 2022-11-04 DIAGNOSIS — E11.9 TYPE 2 DIABETES MELLITUS WITHOUT COMPLICATIONS: ICD-10-CM

## 2022-11-04 DIAGNOSIS — I10 ESSENTIAL (PRIMARY) HYPERTENSION: ICD-10-CM

## 2022-11-04 LAB
ANION GAP SERPL CALC-SCNC: 15 MMOL/L
BASOPHILS # BLD AUTO: 0.04 K/UL
BASOPHILS NFR BLD AUTO: 0.4 %
BUN SERPL-MCNC: 9 MG/DL
CALCIUM SERPL-MCNC: 10.3 MG/DL
CHLORIDE SERPL-SCNC: 102 MMOL/L
CO2 SERPL-SCNC: 21 MMOL/L
CREAT SERPL-MCNC: 0.6 MG/DL
EGFR: 117 ML/MIN/1.73M2
EOSINOPHIL # BLD AUTO: 0.22 K/UL
EOSINOPHIL NFR BLD AUTO: 2.2 %
FERRITIN SERPL-MCNC: 42 NG/ML
GLUCOSE SERPL-MCNC: 100 MG/DL
HCT VFR BLD CALC: 37.3 %
HGB BLD-MCNC: 10.4 G/DL
IMM GRANULOCYTES NFR BLD AUTO: 0.4 %
IRON SATN MFR SERPL: 5 %
IRON SERPL-MCNC: 25 UG/DL
LYMPHOCYTES # BLD AUTO: 3.62 K/UL
LYMPHOCYTES NFR BLD AUTO: 36.4 %
MAGNESIUM SERPL-MCNC: 2.1 MG/DL
MAN DIFF?: NORMAL
MCHC RBC-ENTMCNC: 21.3 PG
MCHC RBC-ENTMCNC: 27.9 GM/DL
MCV RBC AUTO: 76.4 FL
MONOCYTES # BLD AUTO: 0.42 K/UL
MONOCYTES NFR BLD AUTO: 4.2 %
NEUTROPHILS # BLD AUTO: 5.61 K/UL
NEUTROPHILS NFR BLD AUTO: 56.4 %
PHOSPHATE SERPL-MCNC: 4.2 MG/DL
PLATELET # BLD AUTO: 369 K/UL
POTASSIUM SERPL-SCNC: 4.8 MMOL/L
RBC # BLD: 4.88 M/UL
RBC # FLD: 16.1 %
SODIUM SERPL-SCNC: 139 MMOL/L
TIBC SERPL-MCNC: 456 UG/DL
TRANSFERRIN SERPL-MCNC: 392 MG/DL
UIBC SERPL-MCNC: 432 UG/DL
WBC # FLD AUTO: 9.95 K/UL

## 2022-11-17 ENCOUNTER — APPOINTMENT (OUTPATIENT)
Dept: INTERNAL MEDICINE | Facility: CLINIC | Age: 37
End: 2022-11-17

## 2022-11-30 ENCOUNTER — OUTPATIENT (OUTPATIENT)
Dept: OUTPATIENT SERVICES | Facility: HOSPITAL | Age: 37
LOS: 1 days | End: 2022-11-30

## 2022-11-30 ENCOUNTER — APPOINTMENT (OUTPATIENT)
Dept: GYNECOLOGIC ONCOLOGY | Facility: HOSPITAL | Age: 37
End: 2022-11-30

## 2022-11-30 VITALS
HEART RATE: 89 BPM | DIASTOLIC BLOOD PRESSURE: 90 MMHG | HEIGHT: 61 IN | TEMPERATURE: 97.3 F | SYSTOLIC BLOOD PRESSURE: 147 MMHG

## 2022-11-30 PROCEDURE — 99024 POSTOP FOLLOW-UP VISIT: CPT

## 2022-11-30 NOTE — REASON FOR VISIT
[Post Op] : post op visit [Family Member] : family member [de-identified] : 10/14/2022 [de-identified] : GIORGI

## 2022-12-02 DIAGNOSIS — D27.9 BENIGN NEOPLASM OF UNSPECIFIED OVARY: ICD-10-CM

## 2022-12-02 DIAGNOSIS — D25.9 LEIOMYOMA OF UTERUS, UNSPECIFIED: ICD-10-CM

## 2023-01-18 ENCOUNTER — APPOINTMENT (OUTPATIENT)
Dept: CARDIOLOGY | Facility: HOSPITAL | Age: 38
End: 2023-01-18

## 2023-03-15 NOTE — DISCHARGE NOTE PROVIDER - NSDCCPGOAL_GEN_ALL_CORE_FT
PICC/Midline:    A PICC line   was placed using direct puncture and ultrasound guided in the patient floor for the following indication(s): hydration, intravenous access, intravenous antibiotics and total parenteral nutrition.  3/15/2023 3:45 PM, 3/15/2023 4:47 PM.  Staffing  Performed: Anesthesiologist   Anesthesiologist: SAV Fiore - [facility]ility preparation included the following: hand hygiene performed prior to procedure, maximum sterile barriers used and sterile technique used to drape from head to toe..  The  right,  brachial vein was prepped.A 5.5 Fr (size), Chloraprep}lidocaine 1%, single lumen   Advanced 46 cm., Exposed 40 cm., Total 50 cm.    chest x-ray    Additional notes:  Pt ID and consented. PICC single lumen placed to Right Brachial vein with US guidance. Procedure performed sterile. Line trimmed to 50 cm and threaded in 46cm with 4 cm showing at skin. Stat locked in place with occlusive dressing in place. Line flushes easilly with slow aspiration. CXR verifies placement.         BARD KIT  Preanesthetic Checklist  Completed: patient identified, IV checked, site marked, risks and benefits discussed, surgical/procedural consents, equipment checked, pre-op evaluation, timeout performed, anesthesia consent given, oxygen available, monitors applied/VS acknowledged, fire risk safety assessment completed and verbalized and blood product R/B/A discussed and consented         To get better and follow your care plan as instructed.

## 2023-04-14 NOTE — ED CDU PROVIDER SUBSEQUENT DAY NOTE - CROS ED ROS STATEMENT
Endoscopy Case End Note:    7532:  Procedure scope was pre-cleaned, per protocol, at bedside by Genna. 6227:  Report received from anesthesia Lacie Don CRNA. See anesthesia flowsheet for intra-procedure vital signs and events. all other ROS negative except as per HPI

## 2023-07-11 ENCOUNTER — NON-APPOINTMENT (OUTPATIENT)
Age: 38
End: 2023-07-11

## 2023-07-12 ENCOUNTER — RESULT REVIEW (OUTPATIENT)
Age: 38
End: 2023-07-12

## 2023-07-12 ENCOUNTER — OUTPATIENT (OUTPATIENT)
Dept: OUTPATIENT SERVICES | Facility: HOSPITAL | Age: 38
LOS: 1 days | End: 2023-07-12

## 2023-07-12 ENCOUNTER — APPOINTMENT (OUTPATIENT)
Dept: OBGYN | Facility: HOSPITAL | Age: 38
End: 2023-07-12
Payer: COMMERCIAL

## 2023-07-12 VITALS
BODY MASS INDEX: 30.96 KG/M2 | WEIGHT: 164 LBS | SYSTOLIC BLOOD PRESSURE: 144 MMHG | HEART RATE: 92 BPM | TEMPERATURE: 98 F | DIASTOLIC BLOOD PRESSURE: 96 MMHG | HEIGHT: 61 IN

## 2023-07-12 DIAGNOSIS — Z90.721 ACQUIRED ABSENCE OF OVARIES, UNILATERAL: Chronic | ICD-10-CM

## 2023-07-12 DIAGNOSIS — N95.1 MENOPAUSAL AND FEMALE CLIMACTERIC STATES: ICD-10-CM

## 2023-07-12 DIAGNOSIS — Z01.419 ENCOUNTER FOR GYNECOLOGICAL EXAMINATION (GENERAL) (ROUTINE) W/OUT ABNORMAL FINDINGS: ICD-10-CM

## 2023-07-12 DIAGNOSIS — F52.22 FEMALE SEXUAL AROUSAL DISORDER: ICD-10-CM

## 2023-07-12 PROCEDURE — 99213 OFFICE O/P EST LOW 20 MIN: CPT | Mod: GE

## 2023-07-12 RX ORDER — VENLAFAXINE HYDROCHLORIDE 37.5 MG/1
37.5 CAPSULE, EXTENDED RELEASE ORAL DAILY
Qty: 30 | Refills: 6 | Status: ACTIVE | COMMUNITY
Start: 2023-07-12 | End: 1900-01-01

## 2023-07-13 ENCOUNTER — LABORATORY RESULT (OUTPATIENT)
Age: 38
End: 2023-07-13

## 2023-07-13 ENCOUNTER — APPOINTMENT (OUTPATIENT)
Dept: INTERNAL MEDICINE | Facility: CLINIC | Age: 38
End: 2023-07-13
Payer: COMMERCIAL

## 2023-07-13 ENCOUNTER — APPOINTMENT (OUTPATIENT)
Dept: OBGYN | Facility: HOSPITAL | Age: 38
End: 2023-07-13

## 2023-07-13 ENCOUNTER — OUTPATIENT (OUTPATIENT)
Dept: OUTPATIENT SERVICES | Facility: HOSPITAL | Age: 38
LOS: 1 days | End: 2023-07-13

## 2023-07-13 VITALS
HEART RATE: 98 BPM | DIASTOLIC BLOOD PRESSURE: 90 MMHG | SYSTOLIC BLOOD PRESSURE: 136 MMHG | WEIGHT: 166 LBS | HEIGHT: 61 IN | BODY MASS INDEX: 31.34 KG/M2 | OXYGEN SATURATION: 99 %

## 2023-07-13 DIAGNOSIS — E11.9 TYPE 2 DIABETES MELLITUS W/OUT COMPLICATIONS: ICD-10-CM

## 2023-07-13 DIAGNOSIS — Z90.721 ACQUIRED ABSENCE OF OVARIES, UNILATERAL: Chronic | ICD-10-CM

## 2023-07-13 DIAGNOSIS — E55.9 VITAMIN D DEFICIENCY, UNSPECIFIED: ICD-10-CM

## 2023-07-13 DIAGNOSIS — D64.9 ANEMIA, UNSPECIFIED: ICD-10-CM

## 2023-07-13 DIAGNOSIS — I10 ESSENTIAL (PRIMARY) HYPERTENSION: ICD-10-CM

## 2023-07-13 DIAGNOSIS — Z98.890 OTHER SPECIFIED POSTPROCEDURAL STATES: Chronic | ICD-10-CM

## 2023-07-13 DIAGNOSIS — L30.4 ERYTHEMA INTERTRIGO: ICD-10-CM

## 2023-07-13 PROBLEM — Z01.419 WELL WOMAN EXAM: Status: ACTIVE | Noted: 2023-07-12

## 2023-07-13 PROCEDURE — ZZZZZ: CPT

## 2023-07-13 RX ORDER — METFORMIN HYDROCHLORIDE 500 MG/1
500 TABLET, COATED ORAL
Qty: 360 | Refills: 0 | Status: ACTIVE | COMMUNITY
Start: 2022-09-17 | End: 1900-01-01

## 2023-07-13 RX ORDER — BLOOD SUGAR DIAGNOSTIC
STRIP MISCELLANEOUS
Qty: 1 | Refills: 3 | Status: ACTIVE | COMMUNITY
Start: 2022-11-03 | End: 1900-01-01

## 2023-07-13 RX ORDER — AMLODIPINE BESYLATE 5 MG/1
5 TABLET ORAL DAILY
Qty: 90 | Refills: 0 | Status: ACTIVE | COMMUNITY
Start: 2023-07-13 | End: 1900-01-01

## 2023-07-13 RX ORDER — TRIAMCINOLONE ACETONIDE 5 MG/G
0.5 CREAM TOPICAL
Qty: 1 | Refills: 0 | Status: DISCONTINUED | COMMUNITY
Start: 2022-11-03 | End: 2023-07-13

## 2023-07-13 RX ORDER — TERBINAFINE HCL 1 %
1 CREAM (GRAM) TOPICAL
Qty: 1 | Refills: 0 | Status: ACTIVE | COMMUNITY
Start: 2023-07-13 | End: 1900-01-01

## 2023-07-13 RX ORDER — METHYLDOPA 500 MG
160 (50 FE) TABLET ORAL
Refills: 0 | Status: DISCONTINUED | COMMUNITY
Start: 2022-09-13 | End: 2023-07-13

## 2023-07-13 NOTE — PHYSICAL EXAM
[Chaperone Present] : A chaperone was present in the examining room during all aspects of the physical examination [Appropriately responsive] : appropriately responsive [Alert] : alert [No Acute Distress] : no acute distress [Soft] : soft [Non-tender] : non-tender [Oriented x3] : oriented x3 [Examination Of The Breasts] : a normal appearance [No Discharge] : no discharge [No Masses] : no breast masses were palpable [Normal] : normal [Absent] : absent [FreeTextEntry6] : Hyperpigmentation under b/l breasts and in b/l axilla [FreeTextEntry4] : vaginal cuff intact

## 2023-07-13 NOTE — PLAN
[FreeTextEntry1] : \par \par Patient is a 38 year old F with hx of HTN, DM2, R adnexal mass s/p ex lap, DINO R salpingo oophorectomy, coming in for a follow up.\par \par #intertrigo\par - will send Lamisil, discussed conservative measures to keep skin dry, advised to notify if not worsening, needs good diabetes control\par \par #HTN\par - on lisinopril 40mg, BP has been above goal\par - add  amlodipine 5mg\par - advised to cont to monitor BP\par - needs f/u with a Dr in 2 weeks-- pt is pending to travel back to [place]\par - pod/optho referral, microalbumin \par \par #DM2\par - A1c 7.5, increase metformin to 1000mg BID, discussed diabetic diet, refused nutrition referral\par - optho and podiatry referral\par - lipid panel today as pt is not on statin \par \par #anemia\par -will repeat CBC and iron studies\par \par #hx of low vit D\par -will check vit D \par \par f/u in 2 wks w manohar [place] and f/u w me  in 3 mo\par

## 2023-07-13 NOTE — HISTORY OF PRESENT ILLNESS
[FreeTextEntry1] : 38y  with PMHx HTN, T2DM presenting for annual visit however with specific concerns. Patient reports hot flashes occurring 2-3x/wk that started after DINO/RSO in 2022. These are very disruptive to her. Also endorses decreased libido, however no dyspareunia. Denies pelvic pain or vaginal bleeding. Endorses "bone pain" that improves with OTC medication.\par \par OBHx:\par -  x1\par - TOP x3 s/p D&C x3\par GYNHx:\par - s/p LSC LSO  (c/f torsion), DINO/RSO  (benign pathology)\par - denies abnl pap smears, STIs\par PMHx: HTN, T2DM, Vit D Deficiency\par PSHx: s/p DINO/RSO (), LSC LSO (), D&C x3\par Meds: Metformin 500 BID, Lisinopril 40 QD, Vit D\par Allergies: NKDA\par \par Social: lives in [facility], comes to [place] frequently

## 2023-07-13 NOTE — PHYSICAL EXAM
[No Acute Distress] : no acute distress [Supple] : supple [No Respiratory Distress] : no respiratory distress  [No Accessory Muscle Use] : no accessory muscle use [Clear to Auscultation] : lungs were clear to auscultation bilaterally [Normal Rate] : normal rate  [Regular Rhythm] : with a regular rhythm [Normal S1, S2] : normal S1 and S2 [Soft] : abdomen soft [Non Tender] : non-tender [Non-distended] : non-distended [Normal Bowel Sounds] : normal bowel sounds [de-identified] : has dark discoloration underneath both breasts

## 2023-07-13 NOTE — PLAN
[FreeTextEntry1] : 38y  w/ PMHx HTN, DM2, presenting for annual exam with reports of hot flashes and decreased libido, both likely secondary to surgical menopause.\par \par - Medical/surgical/family history reviewed\par - Pap: NIELM/HRHPV- (), now s/p DINO. No prior paps. No indication for further pap testing.\par - Mammo: script provided\par - Declines STI testing today\par - Continue Vitamin D\par - Reviewed breast awareness\par - Patient instructed to make a follow-up appt with PCP to further discuss HTN/DM management and bone pain (?arthritis). Importance emphasized, especially given elevated BP in office today. Pt confirms understanding.\par \par 2. Vasomotor Symptoms\par - Start Effexor 37.5 XR QD. 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 Sandra,\par H Sinks PGY2\par

## 2023-07-14 ENCOUNTER — OUTPATIENT (OUTPATIENT)
Dept: OUTPATIENT SERVICES | Facility: HOSPITAL | Age: 38
LOS: 1 days | End: 2023-07-14
Payer: COMMERCIAL

## 2023-07-14 ENCOUNTER — OUTPATIENT (OUTPATIENT)
Dept: OUTPATIENT SERVICES | Facility: HOSPITAL | Age: 38
LOS: 1 days | End: 2023-07-14

## 2023-07-14 ENCOUNTER — APPOINTMENT (OUTPATIENT)
Dept: MAMMOGRAPHY | Facility: IMAGING CENTER | Age: 38
End: 2023-07-14
Payer: SELF-PAY

## 2023-07-14 ENCOUNTER — APPOINTMENT (OUTPATIENT)
Dept: INTERNAL MEDICINE | Facility: CLINIC | Age: 38
End: 2023-07-14

## 2023-07-14 ENCOUNTER — RESULT REVIEW (OUTPATIENT)
Age: 38
End: 2023-07-14

## 2023-07-14 DIAGNOSIS — E55.9 VITAMIN D DEFICIENCY, UNSPECIFIED: ICD-10-CM

## 2023-07-14 DIAGNOSIS — Z01.419 ENCOUNTER FOR GYNECOLOGICAL EXAMINATION (GENERAL) (ROUTINE) WITHOUT ABNORMAL FINDINGS: ICD-10-CM

## 2023-07-14 DIAGNOSIS — Z98.890 OTHER SPECIFIED POSTPROCEDURAL STATES: Chronic | ICD-10-CM

## 2023-07-14 DIAGNOSIS — R74.01 ELEVATION OF LEVELS OF LIVER TRANSAMINASE LEVELS: ICD-10-CM

## 2023-07-14 DIAGNOSIS — Z90.721 ACQUIRED ABSENCE OF OVARIES, UNILATERAL: Chronic | ICD-10-CM

## 2023-07-14 DIAGNOSIS — L30.4 ERYTHEMA INTERTRIGO: ICD-10-CM

## 2023-07-14 DIAGNOSIS — E61.1 IRON DEFICIENCY: ICD-10-CM

## 2023-07-14 DIAGNOSIS — I10 ESSENTIAL (PRIMARY) HYPERTENSION: ICD-10-CM

## 2023-07-14 DIAGNOSIS — D64.9 ANEMIA, UNSPECIFIED: ICD-10-CM

## 2023-07-14 DIAGNOSIS — E11.9 TYPE 2 DIABETES MELLITUS WITHOUT COMPLICATIONS: ICD-10-CM

## 2023-07-14 DIAGNOSIS — F52.22 FEMALE SEXUAL AROUSAL DISORDER: ICD-10-CM

## 2023-07-14 DIAGNOSIS — N95.1 MENOPAUSAL AND FEMALE CLIMACTERIC STATES: ICD-10-CM

## 2023-07-14 LAB
25(OH)D3 SERPL-MCNC: 29.3 NG/ML
ALBUMIN SERPL ELPH-MCNC: 5.1 G/DL
ALP BLD-CCNC: 108 U/L
ALT SERPL-CCNC: 83 U/L
ANION GAP SERPL CALC-SCNC: 17 MMOL/L
AST SERPL-CCNC: 46 U/L
BASOPHILS # BLD AUTO: 0.04 K/UL
BASOPHILS NFR BLD AUTO: 0.4 %
BILIRUB SERPL-MCNC: 0.3 MG/DL
BUN SERPL-MCNC: 13 MG/DL
CALCIUM SERPL-MCNC: 11 MG/DL
CHLORIDE SERPL-SCNC: 101 MMOL/L
CHOLEST SERPL-MCNC: 245 MG/DL
CO2 SERPL-SCNC: 22 MMOL/L
CREAT SERPL-MCNC: 0.58 MG/DL
CREAT SPEC-SCNC: 123 MG/DL
EGFR: 117 ML/MIN/1.73M2
EOSINOPHIL # BLD AUTO: 0.13 K/UL
EOSINOPHIL NFR BLD AUTO: 1.3 %
FERRITIN SERPL-MCNC: 44 NG/ML
FOLATE SERPL-MCNC: 17.1 NG/ML
GLUCOSE SERPL-MCNC: 177 MG/DL
HCT VFR BLD CALC: 42.6 %
HDLC SERPL-MCNC: 49 MG/DL
HGB BLD-MCNC: 11.7 G/DL
IMM GRANULOCYTES NFR BLD AUTO: 0.3 %
IRON SATN MFR SERPL: 7 %
IRON SERPL-MCNC: 36 UG/DL
LDLC SERPL CALC-MCNC: 166 MG/DL
LDLC SERPL DIRECT ASSAY-MCNC: 171 MG/DL
LYMPHOCYTES # BLD AUTO: 3.91 K/UL
LYMPHOCYTES NFR BLD AUTO: 40.4 %
MAN DIFF?: NORMAL
MCHC RBC-ENTMCNC: 22.2 PG
MCHC RBC-ENTMCNC: 27.5 GM/DL
MCV RBC AUTO: 80.7 FL
MICROALBUMIN 24H UR DL<=1MG/L-MCNC: 1.5 MG/DL
MICROALBUMIN/CREAT 24H UR-RTO: 12 MG/G
MONOCYTES # BLD AUTO: 0.53 K/UL
MONOCYTES NFR BLD AUTO: 5.5 %
NEUTROPHILS # BLD AUTO: 5.05 K/UL
NEUTROPHILS NFR BLD AUTO: 52.1 %
NONHDLC SERPL-MCNC: 196 MG/DL
PLATELET # BLD AUTO: 296 K/UL
POTASSIUM SERPL-SCNC: 4.6 MMOL/L
PROT SERPL-MCNC: 8.5 G/DL
RBC # BLD: 5.28 M/UL
RBC # BLD: 5.28 M/UL
RBC # FLD: 17.2 %
RETICS # AUTO: 2 %
RETICS AGGREG/RBC NFR: 106.1 K/UL
SODIUM SERPL-SCNC: 140 MMOL/L
TIBC SERPL-MCNC: 547 UG/DL
TRIGL SERPL-MCNC: 166 MG/DL
UIBC SERPL-MCNC: 511 UG/DL
VIT B12 SERPL-MCNC: 874 PG/ML
WBC # FLD AUTO: 9.69 K/UL

## 2023-07-14 PROCEDURE — 77063 BREAST TOMOSYNTHESIS BI: CPT

## 2023-07-14 PROCEDURE — 77067 SCR MAMMO BI INCL CAD: CPT | Mod: 26

## 2023-07-14 PROCEDURE — 77067 SCR MAMMO BI INCL CAD: CPT

## 2023-07-14 PROCEDURE — 77063 BREAST TOMOSYNTHESIS BI: CPT | Mod: 26

## 2023-07-14 RX ORDER — CHLORHEXIDINE GLUCONATE 4 %
325 (65 FE) LIQUID (ML) TOPICAL
Qty: 45 | Refills: 0 | Status: ACTIVE | COMMUNITY
Start: 2023-07-14 | End: 1900-01-01

## 2023-07-14 RX ORDER — CHOLECALCIFEROL (VITAMIN D3) 50 MCG
50 MCG TABLET ORAL
Qty: 90 | Refills: 1 | Status: ACTIVE | COMMUNITY
Start: 2022-09-14 | End: 1900-01-01

## 2023-07-16 LAB
HBV SURFACE AG SER QL: NONREACTIVE
HCV AB SER QL: NONREACTIVE
HCV S/CO RATIO: 0.09 S/CO
HEPATITIS A IGG ANTIBODY: NONREACTIVE

## 2023-07-17 ENCOUNTER — NON-APPOINTMENT (OUTPATIENT)
Age: 38
End: 2023-07-17

## 2023-07-17 DIAGNOSIS — R74.01 ELEVATION OF LEVELS OF LIVER TRANSAMINASE LEVELS: ICD-10-CM

## 2023-07-17 LAB — HEPATITIS E IGM ABY: NEGATIVE

## 2023-07-18 LAB — CA-I SERPL-SCNC: 5 MG/DL

## 2023-10-12 ENCOUNTER — APPOINTMENT (OUTPATIENT)
Dept: INTERNAL MEDICINE | Facility: CLINIC | Age: 38
End: 2023-10-12

## 2023-11-02 ENCOUNTER — APPOINTMENT (OUTPATIENT)
Dept: GASTROENTEROLOGY | Facility: CLINIC | Age: 38
End: 2023-11-02

## 2024-01-17 ENCOUNTER — APPOINTMENT (OUTPATIENT)
Dept: OBGYN | Facility: HOSPITAL | Age: 39
End: 2024-01-17

## (undated) DEVICE — LAP PAD 18 X 18"

## (undated) DEVICE — SUT QUILL MONODERM 3-0 30CM 19MM

## (undated) DEVICE — PACK MAJOR ABDOMINAL WITH LAP

## (undated) DEVICE — MEDICINE CUP WITH LID 60ML

## (undated) DEVICE — GLV 8 PROTEXIS (BLUE)

## (undated) DEVICE — SUT PDS II 1 48" TP-1

## (undated) DEVICE — TRAP SPECIMEN SPUTUM 40CC

## (undated) DEVICE — GLV 7 PROTEXIS (WHITE)

## (undated) DEVICE — FOLEY TRAY 16FR 5CC LF UMETER CLOSED

## (undated) DEVICE — SUT SILK 2-0 30" TIES

## (undated) DEVICE — SPONGE DISSECTOR PEANUT

## (undated) DEVICE — ELCTR GROUNDING PAD ADULT COVIDIEN

## (undated) DEVICE — DRSG MEDIPORE + PAD 3.5X10"

## (undated) DEVICE — WARMING BLANKET UPPER ADULT

## (undated) DEVICE — PACK PERI GYN

## (undated) DEVICE — SOL IRR POUR H2O 250ML

## (undated) DEVICE — DRAPE GENERAL ENDOSCOPY

## (undated) DEVICE — DRAPE 3/4 SHEET 52X76"

## (undated) DEVICE — PREP BETADINE SPONGE STICKS

## (undated) DEVICE — SUT VICRYL 0 18" TIES

## (undated) DEVICE — SUT SILK 3-0 18" TIES

## (undated) DEVICE — DRSG TELFA 4 X 8

## (undated) DEVICE — CANISTER DISPOSABLE THIN WALL 3000CC

## (undated) DEVICE — SOL IRR POUR NS 0.9% 500ML

## (undated) DEVICE — DRSG STERISTRIPS 0.5 X 4"

## (undated) DEVICE — LIGASURE IMPACT

## (undated) DEVICE — POSITIONER FOAM EGG CRATE ULNAR 2PCS (PINK)

## (undated) DEVICE — SPONGE PEANUT AUTO COUNT

## (undated) DEVICE — SUT VICRYL 2-0 27" CT-1 UNDYED

## (undated) DEVICE — GLV 8.5 PROTEXIS (WHITE)

## (undated) DEVICE — VENODYNE/SCD SLEEVE CALF MEDIUM

## (undated) DEVICE — SUT QUILL MONODERM 3-0 30CM PS-2

## (undated) DEVICE — GLV 7.5 PROTEXIS (WHITE)

## (undated) DEVICE — ELCTR BOVIE BLADE 3/4" EXTENDED LENGTH 6"

## (undated) DEVICE — DRAPE FLUID WARMER 44 X 44"

## (undated) DEVICE — SUT SILK 2-0 18" TIES

## (undated) DEVICE — GLV 6.5 PROTEXIS W HYDROGEL

## (undated) DEVICE — SUT MONOCRYL 2-0 27" SH VIOLET

## (undated) DEVICE — SUT VICRYL 0 18" TIES UNDYED

## (undated) DEVICE — POSITIONER PINK PAD PIGAZZI SYSTEM

## (undated) DEVICE — BASIN SET SINGLE

## (undated) DEVICE — GLV 8 PROTEXIS (WHITE)

## (undated) DEVICE — DRSG MASTISOL